# Patient Record
Sex: FEMALE | Race: WHITE | NOT HISPANIC OR LATINO | Employment: FULL TIME | URBAN - METROPOLITAN AREA
[De-identification: names, ages, dates, MRNs, and addresses within clinical notes are randomized per-mention and may not be internally consistent; named-entity substitution may affect disease eponyms.]

---

## 2023-05-13 ENCOUNTER — TRANSFERRED RECORDS (OUTPATIENT)
Dept: HEALTH INFORMATION MANAGEMENT | Facility: CLINIC | Age: 23
End: 2023-05-13

## 2023-07-31 ENCOUNTER — OFFICE VISIT (OUTPATIENT)
Dept: FAMILY MEDICINE | Facility: CLINIC | Age: 23
End: 2023-07-31
Payer: COMMERCIAL

## 2023-07-31 VITALS
DIASTOLIC BLOOD PRESSURE: 70 MMHG | SYSTOLIC BLOOD PRESSURE: 128 MMHG | WEIGHT: 167 LBS | OXYGEN SATURATION: 96 % | HEART RATE: 99 BPM | TEMPERATURE: 98 F | RESPIRATION RATE: 14 BRPM | BODY MASS INDEX: 24.73 KG/M2 | HEIGHT: 69 IN

## 2023-07-31 DIAGNOSIS — Z30.41 ENCOUNTER FOR SURVEILLANCE OF CONTRACEPTIVE PILLS: Primary | ICD-10-CM

## 2023-07-31 DIAGNOSIS — Z11.3 SCREEN FOR STD (SEXUALLY TRANSMITTED DISEASE): ICD-10-CM

## 2023-07-31 DIAGNOSIS — N92.1 BREAKTHROUGH BLEEDING ON BIRTH CONTROL PILLS: ICD-10-CM

## 2023-07-31 LAB
ERYTHROCYTE [DISTWIDTH] IN BLOOD BY AUTOMATED COUNT: 11.6 % (ref 10–15)
HCG UR QL: NEGATIVE
HCT VFR BLD AUTO: 43.9 % (ref 35–47)
HGB BLD-MCNC: 14.4 G/DL (ref 11.7–15.7)
MCH RBC QN AUTO: 30.9 PG (ref 26.5–33)
MCHC RBC AUTO-ENTMCNC: 32.8 G/DL (ref 31.5–36.5)
MCV RBC AUTO: 94 FL (ref 78–100)
PLATELET # BLD AUTO: 243 10E3/UL (ref 150–450)
RBC # BLD AUTO: 4.66 10E6/UL (ref 3.8–5.2)
T4 FREE SERPL-MCNC: 1.11 NG/DL (ref 0.9–1.7)
TSH SERPL DL<=0.005 MIU/L-ACNC: 4.77 UIU/ML (ref 0.3–4.2)
WBC # BLD AUTO: 5.1 10E3/UL (ref 4–11)

## 2023-07-31 PROCEDURE — 99204 OFFICE O/P NEW MOD 45 MIN: CPT | Performed by: NURSE PRACTITIONER

## 2023-07-31 PROCEDURE — 85027 COMPLETE CBC AUTOMATED: CPT | Performed by: NURSE PRACTITIONER

## 2023-07-31 PROCEDURE — 36415 COLL VENOUS BLD VENIPUNCTURE: CPT | Performed by: NURSE PRACTITIONER

## 2023-07-31 PROCEDURE — 84443 ASSAY THYROID STIM HORMONE: CPT | Performed by: NURSE PRACTITIONER

## 2023-07-31 PROCEDURE — 87491 CHLMYD TRACH DNA AMP PROBE: CPT | Performed by: NURSE PRACTITIONER

## 2023-07-31 PROCEDURE — 87591 N.GONORRHOEAE DNA AMP PROB: CPT | Performed by: NURSE PRACTITIONER

## 2023-07-31 PROCEDURE — 81025 URINE PREGNANCY TEST: CPT | Performed by: NURSE PRACTITIONER

## 2023-07-31 PROCEDURE — 84439 ASSAY OF FREE THYROXINE: CPT | Performed by: NURSE PRACTITIONER

## 2023-07-31 RX ORDER — LEVONORGESTREL/ETHIN.ESTRADIOL 0.1-0.02MG
1 TABLET ORAL DAILY
Qty: 84 TABLET | Refills: 2 | Status: SHIPPED | OUTPATIENT
Start: 2023-07-31 | End: 2024-04-01

## 2023-07-31 RX ORDER — LEVONORGESTREL/ETHIN.ESTRADIOL 0.1-0.02MG
1 TABLET ORAL DAILY
Status: CANCELLED | OUTPATIENT
Start: 2023-07-31

## 2023-07-31 NOTE — PROGRESS NOTES
Assessment & Plan     Encounter for surveillance of contraceptive pills  Breakthrough bleeding on birth control pills  Will do some lab work could just be different variations of manufacturers of birth control causing her breakthrough bleeding.  She has low suspicion for pregnancy or STD.  Will prescribe birth control through our pharmacy; may need to try brand name to see if it helps.  Will have pharmacy notify her once prescription has been sent.  She would like to avoid a 3-month continuous pack at this time.  Jaleesa verbalizes understanding of plan of care and is in agreement.    - TSH with free T4 reflex  - CBC with platelets  - HCG Qual, Urine ([id number])  - TSH with free T4 reflex  - CBC with platelets    Screen for STD (sexually transmitted disease)    - NEISSERIA GONORRHOEA PCR  - CHLAMYDIA TRACHOMATIS PCR        Return in about 8 months (around 3/31/2024).      LIGIA Watts [facility]   Jaleesa is a 23 year old, presenting for the following health issues:  Establish Care, New Patient, and Medication Request        7/31/2023     7:17 AM   Additional Questions   Roomed by Darrick HALE   Accompanied by Self       History of Present Illness       Reason for visit:  To renew my prescription and talk about maybe a differrent option    She eats 0-1 servings of fruits and vegetables daily.She consumes 1 sweetened beverage(s) daily.She exercises with enough effort to increase her heart rate 30 to 60 minutes per day.  She exercises with enough effort to increase her heart rate 4 days per week.   She is taking medications regularly.       Medication Followup of Contraception - Levonogestrel and Ethinyl Estradiol Tablets - 0.1/g/0.02mg --- gets different brands each time refills but same dosage - would like to get same each time  Taking Medication as prescribed: yes  Side Effects:  None -- Last month did get period 2x.   Medication Helping Symptoms:  yes    On ocp since  "2017.  Sexually active not a new partner.  Last Pap was 2 yeas ago at [facility] in [place] - got JENNY to fax.  Unsure what brand name is on birth control. Has been getting different pills packs colors.   She was also provided a 3 month OCP pack and didn't not want to try that she didn't know about this.   Acne has been worse.       Review of Systems   Constitutional, HEENT, cardiovascular, pulmonary, GI, , musculoskeletal, neuro, skin, endocrine and psych systems are negative, except as otherwise noted in the HPI.       Objective    /70 (BP Location: Left arm, Patient Position: Chair, Cuff Size: Adult Regular)   Pulse 99   Temp 98  F (36.7  C) (Tympanic)   Resp 14   Ht 5' 9.4\" (1.763 m)   Wt 167 lb (75.8 kg)   LMP 07/20/2023 (Approximate)   SpO2 96%   BMI 24.38 kg/m    Body mass index is 24.38 kg/m .  Physical Exam   GENERAL: healthy, alert and no distress, very pleasant  RESP: lungs clear to auscultation - no rales, rhonchi or wheezes  BREAST: normal without masses, tenderness or nipple discharge and no palpable axillary masses or adenopathy  CV: regular rate and rhythm, normal S1 S2, no S3 or S4, no murmur, click or rub, no peripheral edema and peripheral pulses strong  PSYCH: mentation appears normal, affect normal/bright            "

## 2023-08-01 LAB
C TRACH DNA SPEC QL NAA+PROBE: NEGATIVE
N GONORRHOEA DNA SPEC QL NAA+PROBE: NEGATIVE

## 2023-08-04 NOTE — RESULT ENCOUNTER NOTE
Dear Jaleesa,    Here is a summary of your recent test results:  Labs look good.   We should recheck your thyroid levels next year as there is a mild elevation.     Please call us at [phone] (or use [employer]) to address the above recommendations if needed.    Thank you for choosing [facility].  It was an honor and a privilege to participate in your care.       Healthy regards,    Gabriela Bright, JUAN DANIEL  [facility]

## 2023-08-13 ENCOUNTER — HEALTH MAINTENANCE LETTER (OUTPATIENT)
Age: 23
End: 2023-08-13

## 2023-10-23 ENCOUNTER — TELEPHONE (OUTPATIENT)
Dept: FAMILY MEDICINE | Facility: CLINIC | Age: 23
End: 2023-10-23
Payer: COMMERCIAL

## 2023-10-23 NOTE — TELEPHONE ENCOUNTER
Patient calls to see if provider would be able to drain her Bartholin's cyst that patient noticed yesterday. Patient has had soreness x 2 days with no drainage. Cyst has been drained roughly 3 times in the last 3 years or so.  She went to provider in [place] who was OBGYN who drained this cyst a few months ago.     Patient wants to know if provider can drain this cyst or if patient will need OBGYN referral (due to new insurance).    Please advise.    Thank you,  Dick Quijano, Triage RN [facility]  8:43 AM 10/23/2023

## 2023-10-23 NOTE — TELEPHONE ENCOUNTER
Spoke to pt. Has hx of Bartholins cyst and it is not super uncomfortable yet but she knows it will be soon. Has had this drained a couple times in the past.    Wanted to see female. Scheduled Thursday with Dr Ramey coming at 4pm Thursday.   Advised sitz bath and warm compress  Routed as fyi.      Judith HERNANDEZ RN BSN

## 2023-10-23 NOTE — TELEPHONE ENCOUNTER
Called and spoke with [facility] nurse. No referral needed. She will call patient and offer appointment this week.     KATHY FULLER RN on 10/23/2023 at 10:07 AM   [facility]

## 2023-10-23 NOTE — TELEPHONE ENCOUNTER
This type of cyst needs OB/GYN to drain and sometimes needs a specific catheter they use to put in there.  Please help her set up with OB/GYN as soon as possible.        Healthy regards,            Gabriela Bright, FNP-BC

## 2023-10-26 ENCOUNTER — OFFICE VISIT (OUTPATIENT)
Dept: OBGYN | Facility: CLINIC | Age: 23
End: 2023-10-26
Payer: COMMERCIAL

## 2023-10-26 VITALS
WEIGHT: 171.7 LBS | SYSTOLIC BLOOD PRESSURE: 118 MMHG | BODY MASS INDEX: 25.43 KG/M2 | HEIGHT: 69 IN | DIASTOLIC BLOOD PRESSURE: 78 MMHG

## 2023-10-26 DIAGNOSIS — N75.0 BARTHOLIN GLAND CYST: Primary | ICD-10-CM

## 2023-10-26 PROCEDURE — 87186 SC STD MICRODIL/AGAR DIL: CPT | Performed by: FAMILY MEDICINE

## 2023-10-26 PROCEDURE — 56420 I&D BARTHOLINS GLAND ABSCESS: CPT | Performed by: FAMILY MEDICINE

## 2023-10-26 PROCEDURE — 87077 CULTURE AEROBIC IDENTIFY: CPT | Performed by: FAMILY MEDICINE

## 2023-10-26 PROCEDURE — 87075 CULTR BACTERIA EXCEPT BLOOD: CPT | Performed by: FAMILY MEDICINE

## 2023-10-26 PROCEDURE — 99203 OFFICE O/P NEW LOW 30 MIN: CPT | Mod: 25 | Performed by: FAMILY MEDICINE

## 2023-10-26 PROCEDURE — 87070 CULTURE OTHR SPECIMN AEROBIC: CPT | Performed by: FAMILY MEDICINE

## 2023-10-26 RX ORDER — CEPHALEXIN 500 MG/1
500 CAPSULE ORAL 2 TIMES DAILY
Qty: 20 CAPSULE | Refills: 3 | Status: SHIPPED | OUTPATIENT
Start: 2023-10-26 | End: 2024-08-06

## 2023-10-26 RX ORDER — LIDOCAINE HYDROCHLORIDE 20 MG/ML
5 INJECTION, SOLUTION INFILTRATION; PERINEURAL ONCE
Status: COMPLETED | OUTPATIENT
Start: 2023-10-26 | End: 2023-10-26

## 2023-10-26 RX ADMIN — LIDOCAINE HYDROCHLORIDE 5 ML: 20 INJECTION, SOLUTION INFILTRATION; PERINEURAL at 16:51

## 2023-10-26 NOTE — PROGRESS NOTES
"SUBJECTIVE:  Jaleesa Palmer is an 23 year old woman who presents for   gynecology consult for bartholin gland cyst/abscess.      Patient's last menstrual period was 10/12/2023 (exact date).       History reviewed. No pertinent past medical history.       History reviewed. No pertinent family history.    History reviewed. No pertinent surgical history.    Current Outpatient Medications   Medication    levonorgestrel-ethinyl estradiol (AVIANE) 0.1-20 MG-MCG tablet     Current Facility-Administered Medications   Medication    lidocaine 2% injection (MDV)     No Known Allergies    Social History     Tobacco Use    Smoking status: Never    Smokeless tobacco: Never   Substance Use Topics    Alcohol use: Not on file       Review Of Systems  Ears/Nose/Throat: negative  Respiratory: No shortness of breath, dyspnea on exertion, cough, or hemoptysis  Cardiovascular: negative  Gastrointestinal: negative  Genitourinary: See HPI   Constitutional, HEENT, cardiovascular, pulmonary, GI, , musculoskeletal, neuro, skin, endocrine and psych systems are negative, except as otherwise noted.    OBJECTIVE:  /78   Ht 1.753 m (5' 9\")   Wt 77.9 kg (171 lb 11.2 oz)   LMP 10/12/2023 (Exact Date)   BMI 25.36 kg/m    General appearance: healthy, alert, and no distress  Pelvic: Pelvic:  Pelvic examination with no pap/no Gonorrhea and Chlamydia   including  External genitalia normal right labia, left labia swollen 2 cm     Procedure: Incision and Drainage  Patient left labial area prepped with betadine.  Injected with 1% plain Lidocaine.  Cruciate incision made over left labia area.  Drainage noted.  Attempted word catheter placement, unable to place due to superficial nature of incision with likely retraction of bartholin gland.      ASSESSMENT:  Jaleesa Palmer is an 23 year old woman who presents for   gynecology consult for bartholin gland cyst/abscess.        PLAN:  Dx:  1)  bartholin gland cyst incision and drainage see above "   Keflex for 10 days   Culture sent         Labs were reviewed in [facility]   Imaging was reviewed in Epic   Tests and documents were reviewed.   Discussion of management or test interpretation       Dr. Tova Ramey,     Obstetrics and Gynecology  [facility] - [place] and [place]

## 2023-10-26 NOTE — PATIENT INSTRUCTIONS
Return in a week for recheck     Dr. Tova Ramey,     Obstetrics and Gynecology  [facility] [place] and [place]

## 2023-10-26 NOTE — NURSING NOTE
"Chief Complaint   Patient presents with    Vaginal Problem     Bartholin cyst--noticed it Sat with pain then noticed bump on Sun       Initial /78   Ht 1.753 m (5' 9\")   Wt 77.9 kg (171 lb 11.2 oz)   LMP 10/12/2023 (Exact Date)   BMI 25.36 kg/m   Estimated body mass index is 25.36 kg/m  as calculated from the following:    Height as of this encounter: 1.753 m (5' 9\").    Weight as of this encounter: 77.9 kg (171 lb 11.2 oz).  BP completed using cuff size: regular    Questioned patient about current smoking habits.  Pt. has never smoked.      No obstetric history on file.    The following HM Due: NONE    "

## 2023-10-29 LAB
BACTERIA FLD CULT: ABNORMAL
BACTERIA FLD CULT: NORMAL

## 2023-10-30 ENCOUNTER — OFFICE VISIT (OUTPATIENT)
Dept: OBGYN | Facility: CLINIC | Age: 23
End: 2023-10-30
Payer: COMMERCIAL

## 2023-10-30 VITALS
WEIGHT: 171 LBS | BODY MASS INDEX: 25.33 KG/M2 | SYSTOLIC BLOOD PRESSURE: 110 MMHG | DIASTOLIC BLOOD PRESSURE: 78 MMHG | HEIGHT: 69 IN

## 2023-10-30 DIAGNOSIS — N75.0 BARTHOLIN GLAND CYST: Primary | ICD-10-CM

## 2023-10-30 PROCEDURE — 99024 POSTOP FOLLOW-UP VISIT: CPT | Performed by: FAMILY MEDICINE

## 2023-10-30 NOTE — PROGRESS NOTES
"SUBJECTIVE: Pre-operative history and physical    Jaleesa Palmer is an 23 year old woman who presents for   Gyn follow-up exam. She was previously seen for bartholin cyst incision and drainage,   with re-accumulation of fluid, Hx of bartholin gland abscess x 3-4.  Had a fever of 101.5 over the weekend.    History reviewed. No pertinent past medical history.       History reviewed. No pertinent family history.    History reviewed. No pertinent surgical history.    Current Outpatient Medications   Medication    cephALEXin (KEFLEX) 500 MG capsule    levonorgestrel-ethinyl estradiol (AVIANE) 0.1-20 MG-MCG tablet     No current facility-administered medications for this visit.     No Known Allergies    Social History     Tobacco Use    Smoking status: Never    Smokeless tobacco: Never   Substance Use Topics    Alcohol use: Not on file       Review Of Systems  Ears/Nose/Throat: negative  Respiratory: No shortness of breath, dyspnea on exertion, cough, or hemoptysis  Cardiovascular: negative  Gastrointestinal: negative  Genitourinary: See HPI   Constitutional, HEENT, cardiovascular, pulmonary, GI, , musculoskeletal, neuro, skin, endocrine and psych systems are negative, except as otherwise noted.      OBJECTIVE:  /78   Ht 1.753 m (5' 9\")   Wt 77.6 kg (171 lb)   LMP 10/12/2023 (Exact Date)   BMI 25.25 kg/m    General appearance: healthy, alert, and no distress  Skin: Skin color, texture, turgor normal. No rashes or lesions.  Ears: negative  Nose/Sinuses: Nares normal. Septum midline. Mucosa normal. No drainage or sinus tenderness.  Oropharynx: Lips, mucosa, and tongue normal. Teeth and gums normal.  Neck: Neck supple. No adenopathy. Thyroid symmetric, normal size,, Carotids without bruits.  Lungs: negative, Percussion normal. Good diaphragmatic excursion. Lungs clear  Heart: negative, PMI normal. No lifts, heaves, or thrills. RRR. No murmurs, clicks gallops or rub  Pelvic: Pelvic examination with no pap/no " Gonorrhea and Chlamydia  External genitalia with left bartholin gland cyst    ASSESSMENT:  Jaleesa Palmer is an 23 year old woman who presents for   Gyn follow-up exam. She was previously seen for bartholin cyst incision and drainage,   with re-accumulation of fluid, Hx of bartholin gland abscess x 3-4.  Had a fever of 101.5 over the weekend.  Cultures showing E.coli and Enterococcus.     PLAN:  Dx: bartholin gland cyst abscess  1)  Desires marsupialization, orders sent   Pain is controlled with advil   2) She is cleared for surgery     Labs were reviewed in [facility]   Imaging was reviewed in Epic   Tests and documents were reviewed.   Discussion of management or test interpretation     Dr. Tova Ramey, DO    OB/GYN   [facility]

## 2023-10-30 NOTE — PATIENT INSTRUCTIONS
Maeve will call with surgery times     Dr. Tova Ramey, DO    Obstetrics and Gynecology  [facility] - [place] and [place]

## 2023-10-30 NOTE — NURSING NOTE
"Chief Complaint   Patient presents with    Vaginal Problem     Discuss surgery--feels like it's not drained all the way       Initial /78   Ht 1.753 m (5' 9\")   Wt 77.6 kg (171 lb)   LMP 10/12/2023 (Exact Date)   BMI 25.25 kg/m   Estimated body mass index is 25.25 kg/m  as calculated from the following:    Height as of this encounter: 1.753 m (5' 9\").    Weight as of this encounter: 77.6 kg (171 lb).  BP completed using cuff size: regular    Questioned patient about current smoking habits.  Pt. has never smoked.      No obstetric history on file.    The following HM Due: NONE    "

## 2023-10-31 ENCOUNTER — TELEPHONE (OUTPATIENT)
Dept: OBGYN | Facility: CLINIC | Age: 23
End: 2023-10-31
Payer: COMMERCIAL

## 2023-10-31 NOTE — TELEPHONE ENCOUNTER
Patient Name: Jaleesa Palmer   MRN: [id number]   Case#: [id number]   Surgeon(s) and Role:      * Tova Ramey,  - Primary   Date requested: 11/2/2023   Location: [facility] OR   Procedure(s):   MARSUPIALIZATION, CYST, BARTHOLIN'S GLAND (Left)

## 2023-10-31 NOTE — TELEPHONE ENCOUNTER
Type of surgery: marsupialization, bartholin's gland cyst  Location of surgery: [facility]  Date and time of surgery: 11/1/23 @ 1:50 pm  Surgeon: Dr. Ramey  Pre-Op Appt Date: day of surgery by surgeon  Post-Op Appt Date: 11/9/23   Packet sent out: Yes  Pre-cert/Authorization completed:  No  Date: 10/31/23

## 2023-11-01 ENCOUNTER — ANESTHESIA EVENT (OUTPATIENT)
Dept: SURGERY | Facility: CLINIC | Age: 23
End: 2023-11-01
Payer: COMMERCIAL

## 2023-11-01 ENCOUNTER — HOSPITAL ENCOUNTER (OUTPATIENT)
Facility: CLINIC | Age: 23
Discharge: HOME | End: 2023-11-01
Attending: FAMILY MEDICINE | Admitting: FAMILY MEDICINE
Payer: COMMERCIAL

## 2023-11-01 ENCOUNTER — ANESTHESIA (OUTPATIENT)
Dept: SURGERY | Facility: CLINIC | Age: 23
End: 2023-11-01
Payer: COMMERCIAL

## 2023-11-01 VITALS
RESPIRATION RATE: 16 BRPM | BODY MASS INDEX: 24.66 KG/M2 | TEMPERATURE: 98.5 F | WEIGHT: 167 LBS | SYSTOLIC BLOOD PRESSURE: 121 MMHG | DIASTOLIC BLOOD PRESSURE: 74 MMHG | OXYGEN SATURATION: 96 % | HEART RATE: 98 BPM

## 2023-11-01 DIAGNOSIS — N75.1 ABSCESS OF LEFT BARTHOLIN'S GLAND: Primary | ICD-10-CM

## 2023-11-01 PROCEDURE — 56440 MRSPLZATN BRTHLNS GLND CST: CPT | Performed by: FAMILY MEDICINE

## 2023-11-01 PROCEDURE — 250N000009 HC RX 250: Performed by: ANESTHESIOLOGY

## 2023-11-01 PROCEDURE — 250N000011 HC RX IP 250 OP 636: Performed by: FAMILY MEDICINE

## 2023-11-01 PROCEDURE — 250N000025 HC SEVOFLURANE, PER MIN: Performed by: FAMILY MEDICINE

## 2023-11-01 PROCEDURE — 710N000009 HC RECOVERY PHASE 1, LEVEL 1, PER MIN: Performed by: FAMILY MEDICINE

## 2023-11-01 PROCEDURE — 250N000011 HC RX IP 250 OP 636: Performed by: ANESTHESIOLOGY

## 2023-11-01 PROCEDURE — 710N000012 HC RECOVERY PHASE 2, PER MINUTE: Performed by: FAMILY MEDICINE

## 2023-11-01 PROCEDURE — 999N000141 HC STATISTIC PRE-PROCEDURE NURSING ASSESSMENT: Performed by: FAMILY MEDICINE

## 2023-11-01 PROCEDURE — 360N000075 HC SURGERY LEVEL 2, PER MIN: Performed by: FAMILY MEDICINE

## 2023-11-01 PROCEDURE — 250N000013 HC RX MED GY IP 250 OP 250 PS 637: Performed by: FAMILY MEDICINE

## 2023-11-01 PROCEDURE — 272N000001 HC OR GENERAL SUPPLY STERILE: Performed by: FAMILY MEDICINE

## 2023-11-01 PROCEDURE — 258N000003 HC RX IP 258 OP 636

## 2023-11-01 PROCEDURE — 370N000017 HC ANESTHESIA TECHNICAL FEE, PER MIN: Performed by: FAMILY MEDICINE

## 2023-11-01 PROCEDURE — 258N000003 HC RX IP 258 OP 636: Performed by: ANESTHESIOLOGY

## 2023-11-01 RX ORDER — FENTANYL CITRATE 50 UG/ML
INJECTION, SOLUTION INTRAMUSCULAR; INTRAVENOUS PRN
Status: DISCONTINUED | OUTPATIENT
Start: 2023-11-01 | End: 2023-11-01

## 2023-11-01 RX ORDER — IBUPROFEN 800 MG/1
800 TABLET, FILM COATED ORAL EVERY 6 HOURS PRN
Qty: 30 TABLET | Refills: 0 | Status: SHIPPED | OUTPATIENT
Start: 2023-11-01 | End: 2024-08-06

## 2023-11-01 RX ORDER — ONDANSETRON 4 MG/1
4 TABLET, ORALLY DISINTEGRATING ORAL EVERY 8 HOURS PRN
Qty: 4 TABLET | Refills: 0 | Status: SHIPPED | OUTPATIENT
Start: 2023-11-01 | End: 2024-08-06

## 2023-11-01 RX ORDER — LABETALOL HYDROCHLORIDE 5 MG/ML
10 INJECTION, SOLUTION INTRAVENOUS EVERY 10 MIN PRN
Status: DISCONTINUED | OUTPATIENT
Start: 2023-11-01 | End: 2023-11-01 | Stop reason: HOSPADM

## 2023-11-01 RX ORDER — ALBUTEROL SULFATE 0.83 MG/ML
2.5 SOLUTION RESPIRATORY (INHALATION) EVERY 4 HOURS PRN
Status: DISCONTINUED | OUTPATIENT
Start: 2023-11-01 | End: 2023-11-01 | Stop reason: HOSPADM

## 2023-11-01 RX ORDER — ONDANSETRON 4 MG/1
4 TABLET, ORALLY DISINTEGRATING ORAL EVERY 30 MIN PRN
Status: DISCONTINUED | OUTPATIENT
Start: 2023-11-01 | End: 2023-11-01 | Stop reason: HOSPADM

## 2023-11-01 RX ORDER — SODIUM CHLORIDE, SODIUM LACTATE, POTASSIUM CHLORIDE, CALCIUM CHLORIDE 600; 310; 30; 20 MG/100ML; MG/100ML; MG/100ML; MG/100ML
INJECTION, SOLUTION INTRAVENOUS CONTINUOUS PRN
Status: DISCONTINUED | OUTPATIENT
Start: 2023-11-01 | End: 2023-11-01

## 2023-11-01 RX ORDER — ACETAMINOPHEN 325 MG/1
975 TABLET ORAL ONCE
Status: COMPLETED | OUTPATIENT
Start: 2023-11-01 | End: 2023-11-01

## 2023-11-01 RX ORDER — OXYCODONE HYDROCHLORIDE 10 MG/1
10 TABLET ORAL EVERY 4 HOURS PRN
Status: DISCONTINUED | OUTPATIENT
Start: 2023-11-01 | End: 2023-11-01 | Stop reason: HOSPADM

## 2023-11-01 RX ORDER — OXYCODONE HYDROCHLORIDE 5 MG/1
5-10 TABLET ORAL EVERY 4 HOURS PRN
Qty: 6 TABLET | Refills: 0 | Status: SHIPPED | OUTPATIENT
Start: 2023-11-01 | End: 2024-08-06

## 2023-11-01 RX ORDER — FENTANYL CITRATE 50 UG/ML
50 INJECTION, SOLUTION INTRAMUSCULAR; INTRAVENOUS EVERY 5 MIN PRN
Status: DISCONTINUED | OUTPATIENT
Start: 2023-11-01 | End: 2023-11-01 | Stop reason: HOSPADM

## 2023-11-01 RX ORDER — FENTANYL CITRATE 50 UG/ML
25 INJECTION, SOLUTION INTRAMUSCULAR; INTRAVENOUS
Status: DISCONTINUED | OUTPATIENT
Start: 2023-11-01 | End: 2023-11-01 | Stop reason: HOSPADM

## 2023-11-01 RX ORDER — DIAZEPAM 10 MG/2ML
2.5 INJECTION, SOLUTION INTRAMUSCULAR; INTRAVENOUS
Status: DISCONTINUED | OUTPATIENT
Start: 2023-11-01 | End: 2023-11-01 | Stop reason: HOSPADM

## 2023-11-01 RX ORDER — SODIUM CHLORIDE, SODIUM LACTATE, POTASSIUM CHLORIDE, CALCIUM CHLORIDE 600; 310; 30; 20 MG/100ML; MG/100ML; MG/100ML; MG/100ML
INJECTION, SOLUTION INTRAVENOUS CONTINUOUS
Status: DISCONTINUED | OUTPATIENT
Start: 2023-11-01 | End: 2023-11-01 | Stop reason: HOSPADM

## 2023-11-01 RX ORDER — KETOROLAC TROMETHAMINE 30 MG/ML
INJECTION, SOLUTION INTRAMUSCULAR; INTRAVENOUS PRN
Status: DISCONTINUED | OUTPATIENT
Start: 2023-11-01 | End: 2023-11-01

## 2023-11-01 RX ORDER — ONDANSETRON 2 MG/ML
INJECTION INTRAMUSCULAR; INTRAVENOUS PRN
Status: DISCONTINUED | OUTPATIENT
Start: 2023-11-01 | End: 2023-11-01

## 2023-11-01 RX ORDER — CEFAZOLIN SODIUM/WATER 2 G/20 ML
2 SYRINGE (ML) INTRAVENOUS
Status: DISCONTINUED | OUTPATIENT
Start: 2023-11-01 | End: 2023-11-01 | Stop reason: HOSPADM

## 2023-11-01 RX ORDER — ONDANSETRON 2 MG/ML
4 INJECTION INTRAMUSCULAR; INTRAVENOUS EVERY 30 MIN PRN
Status: DISCONTINUED | OUTPATIENT
Start: 2023-11-01 | End: 2023-11-01 | Stop reason: HOSPADM

## 2023-11-01 RX ORDER — PROPOFOL 10 MG/ML
INJECTION, EMULSION INTRAVENOUS PRN
Status: DISCONTINUED | OUTPATIENT
Start: 2023-11-01 | End: 2023-11-01

## 2023-11-01 RX ORDER — PROPOFOL 10 MG/ML
INJECTION, EMULSION INTRAVENOUS CONTINUOUS PRN
Status: DISCONTINUED | OUTPATIENT
Start: 2023-11-01 | End: 2023-11-01

## 2023-11-01 RX ORDER — AMOXICILLIN 250 MG
1-2 CAPSULE ORAL 2 TIMES DAILY
Qty: 30 TABLET | Refills: 0 | Status: SHIPPED | OUTPATIENT
Start: 2023-11-01 | End: 2024-08-06

## 2023-11-01 RX ORDER — OXYCODONE HYDROCHLORIDE 5 MG/1
5 TABLET ORAL
Status: DISCONTINUED | OUTPATIENT
Start: 2023-11-01 | End: 2023-11-01 | Stop reason: HOSPADM

## 2023-11-01 RX ORDER — BUPIVACAINE HYDROCHLORIDE 2.5 MG/ML
INJECTION, SOLUTION EPIDURAL; INFILTRATION; INTRACAUDAL PRN
Status: DISCONTINUED | OUTPATIENT
Start: 2023-11-01 | End: 2023-11-01 | Stop reason: HOSPADM

## 2023-11-01 RX ORDER — ACETAMINOPHEN 325 MG/1
975 TABLET ORAL EVERY 6 HOURS PRN
Qty: 50 TABLET | Refills: 0 | Status: SHIPPED | OUTPATIENT
Start: 2023-11-01 | End: 2024-08-06

## 2023-11-01 RX ORDER — FENTANYL CITRATE 50 UG/ML
25 INJECTION, SOLUTION INTRAMUSCULAR; INTRAVENOUS EVERY 5 MIN PRN
Status: DISCONTINUED | OUTPATIENT
Start: 2023-11-01 | End: 2023-11-01 | Stop reason: HOSPADM

## 2023-11-01 RX ORDER — OXYCODONE HYDROCHLORIDE 5 MG/1
5 TABLET ORAL EVERY 4 HOURS PRN
Status: DISCONTINUED | OUTPATIENT
Start: 2023-11-01 | End: 2023-11-01 | Stop reason: HOSPADM

## 2023-11-01 RX ORDER — MEPERIDINE HYDROCHLORIDE 25 MG/ML
12.5 INJECTION INTRAMUSCULAR; INTRAVENOUS; SUBCUTANEOUS EVERY 5 MIN PRN
Status: DISCONTINUED | OUTPATIENT
Start: 2023-11-01 | End: 2023-11-01 | Stop reason: HOSPADM

## 2023-11-01 RX ORDER — DEXAMETHASONE SODIUM PHOSPHATE 4 MG/ML
4 INJECTION, SOLUTION INTRA-ARTICULAR; INTRALESIONAL; INTRAMUSCULAR; INTRAVENOUS; SOFT TISSUE
Status: DISCONTINUED | OUTPATIENT
Start: 2023-11-01 | End: 2023-11-01 | Stop reason: HOSPADM

## 2023-11-01 RX ORDER — LIDOCAINE HYDROCHLORIDE 20 MG/ML
INJECTION, SOLUTION INFILTRATION; PERINEURAL PRN
Status: DISCONTINUED | OUTPATIENT
Start: 2023-11-01 | End: 2023-11-01

## 2023-11-01 RX ORDER — ACETAMINOPHEN 325 MG/1
975 TABLET ORAL ONCE
Status: DISCONTINUED | OUTPATIENT
Start: 2023-11-01 | End: 2023-11-01 | Stop reason: HOSPADM

## 2023-11-01 RX ORDER — KETOROLAC TROMETHAMINE 30 MG/ML
30 INJECTION, SOLUTION INTRAMUSCULAR; INTRAVENOUS ONCE
Status: COMPLETED | OUTPATIENT
Start: 2023-11-01 | End: 2023-11-01

## 2023-11-01 RX ORDER — IBUPROFEN 800 MG/1
800 TABLET, FILM COATED ORAL ONCE
Status: DISCONTINUED | OUTPATIENT
Start: 2023-11-01 | End: 2023-11-01 | Stop reason: HOSPADM

## 2023-11-01 RX ORDER — DEXAMETHASONE SODIUM PHOSPHATE 4 MG/ML
INJECTION, SOLUTION INTRA-ARTICULAR; INTRALESIONAL; INTRAMUSCULAR; INTRAVENOUS; SOFT TISSUE PRN
Status: DISCONTINUED | OUTPATIENT
Start: 2023-11-01 | End: 2023-11-01

## 2023-11-01 RX ORDER — CEFAZOLIN SODIUM/WATER 2 G/20 ML
2 SYRINGE (ML) INTRAVENOUS SEE ADMIN INSTRUCTIONS
Status: DISCONTINUED | OUTPATIENT
Start: 2023-11-01 | End: 2023-11-01 | Stop reason: HOSPADM

## 2023-11-01 RX ADMIN — ACETAMINOPHEN 975 MG: 325 TABLET, FILM COATED ORAL at 12:35

## 2023-11-01 RX ADMIN — MIDAZOLAM 2 MG: 1 INJECTION INTRAMUSCULAR; INTRAVENOUS at 13:04

## 2023-11-01 RX ADMIN — ONDANSETRON 4 MG: 2 INJECTION INTRAMUSCULAR; INTRAVENOUS at 13:42

## 2023-11-01 RX ADMIN — PROPOFOL 200 MG: 10 INJECTION, EMULSION INTRAVENOUS at 13:09

## 2023-11-01 RX ADMIN — KETOROLAC TROMETHAMINE 30 MG: 30 INJECTION, SOLUTION INTRAMUSCULAR at 13:42

## 2023-11-01 RX ADMIN — PROPOFOL 50 MCG/KG/MIN: 10 INJECTION, EMULSION INTRAVENOUS at 13:17

## 2023-11-01 RX ADMIN — DEXAMETHASONE SODIUM PHOSPHATE 8 MG: 4 INJECTION, SOLUTION INTRA-ARTICULAR; INTRALESIONAL; INTRAMUSCULAR; INTRAVENOUS; SOFT TISSUE at 13:10

## 2023-11-01 RX ADMIN — Medication 2 G: at 12:57

## 2023-11-01 RX ADMIN — LIDOCAINE HYDROCHLORIDE 50 MG: 20 INJECTION, SOLUTION INFILTRATION; PERINEURAL at 13:09

## 2023-11-01 RX ADMIN — FENTANYL CITRATE 100 MCG: 50 INJECTION INTRAMUSCULAR; INTRAVENOUS at 13:12

## 2023-11-01 RX ADMIN — SODIUM CHLORIDE, POTASSIUM CHLORIDE, SODIUM LACTATE AND CALCIUM CHLORIDE: 600; 310; 30; 20 INJECTION, SOLUTION INTRAVENOUS at 12:36

## 2023-11-01 RX ADMIN — KETOROLAC TROMETHAMINE 30 MG: 30 INJECTION INTRAMUSCULAR; INTRAVENOUS at 12:39

## 2023-11-01 RX ADMIN — SODIUM CHLORIDE, POTASSIUM CHLORIDE, SODIUM LACTATE AND CALCIUM CHLORIDE: 600; 310; 30; 20 INJECTION, SOLUTION INTRAVENOUS at 12:58

## 2023-11-01 ASSESSMENT — ACTIVITIES OF DAILY LIVING (ADL)
ADLS_ACUITY_SCORE: 35
ADLS_ACUITY_SCORE: 35

## 2023-11-01 NOTE — DISCHARGE INSTRUCTIONS
Follow up in 1-2 weeks   Call [phone] for post op concerns, this will get you to the answering service for the on-call doctor.     Things to be concerned with is: uncontrolled vomiting, bleeding that is more than spotting, pain that doesn't respond to oxycodone and ibuprofen and tylenol   Also fever or general unwell feeling or increased pain.     Also it is ok to please also call for any reason or concern!     Dr. Tova Ramey, DO    OB/GYN   [facility]        GENERAL ANESTHESIA OR SEDATION ADULT DISCHARGE INSTRUCTIONS   SPECIAL PRECAUTIONS FOR 24 HOURS AFTER SURGERY    IT IS NOT UNUSUAL TO FEEL LIGHT-HEADED OR FAINT, UP TO 24 HOURS AFTER SURGERY OR WHILE TAKING PAIN MEDICATION.  IF YOU HAVE THESE SYMPTOMS; SIT FOR A FEW MINUTES BEFORE STANDING AND HAVE SOMEONE ASSIST YOU WHEN YOU GET UP TO WALK OR USE THE BATHROOM.    YOU SHOULD REST AND RELAX FOR THE NEXT 24 HOURS AND YOU MUST MAKE ARRANGEMENTS TO HAVE SOMEONE STAY WITH YOU FOR AT LEAST 24 HOURS AFTER YOUR DISCHARGE.  AVOID HAZARDOUS AND STRENUOUS ACTIVITIES.  DO NOT MAKE IMPORTANT DECISIONS FOR 24 HOURS.    DO NOT DRIVE ANY VEHICLE OR OPERATE MECHANICAL EQUIPMENT FOR 24 HOURS FOLLOWING THE END OF YOUR SURGERY.  EVEN THOUGH YOU MAY FEEL NORMAL, YOUR REACTIONS MAY BE AFFECTED BY THE MEDICATION YOU HAVE RECEIVED.    DO NOT DRINK ALCOHOLIC BEVERAGES FOR 24 HOURS FOLLOWING YOUR SURGERY.    DRINK CLEAR LIQUIDS (APPLE JUICE, GINGER ALE, 7-UP, BROTH, ETC.).  PROGRESS TO YOUR REGULAR DIET AS YOU FEEL ABLE.    YOU MAY HAVE A DRY MOUTH, A SORE THROAT, MUSCLES ACHES OR TROUBLE SLEEPING.  THESE SHOULD GO AWAY AFTER 24 HOURS.    CALL YOUR DOCTOR FOR ANY OF THE FOLLOWING:  SIGNS OF INFECTION (FEVER, GROWING TENDERNESS AT THE SURGERY SITE, A LARGE AMOUNT OF DRAINAGE OR BLEEDING, SEVERE PAIN, FOUL-SMELLING DRAINAGE, REDNESS OR SWELLING.    IT HAS BEEN OVER 8 TO 10 HOURS SINCE SURGERY AND YOU ARE STILL NOT ABLE TO URINATE (PASS  WATER).     You received Toradol, an IV form of Ibuprofen (Motrin) at 12:40pm.  Do not take any Ibuprofen products until 8:40pm.    Maximum acetaminophen (Tylenol) dose from all sources should not exceed 4 grams (4000 mg) per day. You took 975mg at 12:35pm, next dose after 8:35pm

## 2023-11-01 NOTE — ANESTHESIA PROCEDURE NOTES
Airway       Patient location during procedure: OR       Procedure Start/Stop Times: 11/1/2023 1:15 PM  Staff -        Anesthesiologist:  Jose Juan Vidal MD       CRNA: Maty Tim APRN CRNA       Performed By: CRNA  Consent for Airway        Urgency: elective  Indications and Patient Condition       Indications for airway management: turner-procedural       Induction type:intravenous       Mask difficulty assessment: 1 - vent by mask    Final Airway Details       Final airway type: supraglottic airway    Supraglottic Airway Details        Type: LMA       Brand: Air-Q       LMA size: 3    Post intubation assessment        Placement verified by: capnometry and chest rise        Number of attempts at approach: 1       Number of other approaches attempted: 0       Secured with: plastic tape       Ease of procedure: easy       Dentition: Intact and Unchanged    Medication(s) Administered   Medication Administration Time: 11/1/2023 1:15 PM

## 2023-11-01 NOTE — ANESTHESIA CARE TRANSFER NOTE
Patient: Jaleesa Palmer    Procedure: Procedure(s):  MARSUPIALIZATION, CYST, BARTHOLIN'S GLAND       Diagnosis: Bartholin gland cyst [N75.0]  Diagnosis Additional Information: No value filed.    Anesthesia Type:   General     Note:    Oropharynx: oropharynx clear of all foreign objects and spontaneously breathing  Level of Consciousness: awake  Oxygen Supplementation: face mask  Level of Supplemental Oxygen (L/min / FiO2): 6  Independent Airway: airway patency satisfactory and stable  Dentition: dentition unchanged  Vital Signs Stable: post-procedure vital signs reviewed and stable  Report to RN Given: handoff report given  Patient transferred to: PACU    Handoff Report: Identifed the Patient, Identified the Reponsible Provider, Reviewed the pertinent medical history, Discussed the surgical course, Reviewed Intra-OP anesthesia mangement and issues during anesthesia, Set expectations for post-procedure period and Allowed opportunity for questions and acknowledgement of understanding      Vitals:  Vitals Value Taken Time   /67    Temp 36.1    Pulse 96 11/01/23 1357   Resp 20 11/01/23 1357   SpO2 100 % 11/01/23 1357   Vitals shown include unfiled device data.    Electronically Signed By: LIGIA Tucker CRNA  November 1, 2023  1:58 PM

## 2023-11-01 NOTE — OP NOTE
Pre-procedure diagnosis: 22 y/o with recurrent bartholin gland cyst/abscess  Post-procedure diagnosis: 22 y/o with recurrent bartholin gland cyst/abscess    Procedure: marsupialization of left bartholin gland  Surgeon: Dr. Tova Ramey    Assistant:  none  Complications: none   EBL: 5 ml   Indications:22 y/o with recurrent bartholin gland cyst/abscess.  She has had this 4-5 times previously.  Most recently she had an office incision and drainage with re-accumulation of abscess over the weekend. She presents now for definitve therapy.   Risks, benefits and alternatives to the procedure are discussed, she desires.  Definitive surgical management.  Procedure: After informed consent was obtained, the patient was taken to the operating room and placed under general anesthesia. She was prepped and draped in the normal fashion. A time out was performed. The bartholin gland with cyst/abscess  was identified. A cruciate incision was made in the skin. The cyst was reached and entered sharply and drained.  The exudate was sent for culture.  The cyst wall was identified and the cyst wall was sutured to the skin in 8 interrupted sutures using 4-0 vicryl.  The opening was noted to be hemostatic.  The diameter of the opening was 5 mm. The patient tolerated the procedure well and was awakened from anesthesia and taken to the recovery room in stable condition. Sponge, lap and needle counts are correct x 2.   Dr. Tova Ramey, DO   Obstetrics and Gynecology    [facility]

## 2023-11-01 NOTE — BRIEF OP NOTE
[facility]    Brief Operative Note    Pre-operative diagnosis: Bartholin gland cyst [N75.0]  Post-operative diagnosis 24 y/o female with recurrent bartholin cyst/abscess s/p bartholin gland marsupialization    Procedure: MARSUPIALIZATION, CYST, BARTHOLIN'S GLAND, Left - Perineum    Surgeon: Surgeon(s) and Role:     * Toav Ramey, DO - Primary  Anesthesia: General   Estimated Blood Loss: Less than 5 ml    Drains: None  Specimens: * No specimens in log *  Findings:   Left bartholin gland cyst/abscess 3 cm diameter .  Complications: None.  Implants: * No implants in log *

## 2023-11-01 NOTE — ANESTHESIA POSTPROCEDURE EVALUATION
Patient: Jaleesa Palmer    Procedure: Procedure(s):  MARSUPIALIZATION, CYST, BARTHOLIN'S GLAND       Anesthesia Type:  General    Note:     Postop Pain Control: Uneventful            Sign Out: Well controlled pain   PONV: No   Neuro/Psych: Uneventful            Sign Out: Acceptable/Baseline neuro status   Airway/Respiratory: Uneventful            Sign Out: Acceptable/Baseline resp. status   CV/Hemodynamics: Uneventful            Sign Out: Acceptable CV status   Other NRE: NONE   DID A NON-ROUTINE EVENT OCCUR? No           Last vitals:  Vitals Value Taken Time   /71 11/01/23 1420   Temp 98.2  F (36.8  C) 11/01/23 1400   Pulse 80 11/01/23 1424   Resp 10 11/01/23 1424   SpO2 100 % 11/01/23 1424   Vitals shown include unfiled device data.    Electronically Signed By: Jose Juan Vidal MD  November 1, 2023  2:25 PM

## 2023-11-01 NOTE — ANESTHESIA PREPROCEDURE EVALUATION
"Anesthesia Pre-Procedure Evaluation    Patient: Jaleesa Palmer   MRN: [id number] : 2000        Procedure : Procedure(s):  MARSUPIALIZATION, CYST, BARTHOLIN'S GLAND          History reviewed. No pertinent past medical history.   Past Surgical History:   Procedure Laterality Date    wisdom teeth        No Known Allergies   Social History     Tobacco Use    Smoking status: Never     Passive exposure: Never    Smokeless tobacco: Never   Substance Use Topics    Alcohol use: Yes     Comment: occas      Wt Readings from Last 1 Encounters:   23 75.8 kg (167 lb)        Anesthesia Evaluation   Pt has had prior anesthetic. Type: General.    No history of anesthetic complications       ROS/MED HX  ENT/Pulmonary:  - neg pulmonary ROS     Neurologic:  - neg neurologic ROS     Cardiovascular:  - neg cardiovascular ROS     METS/Exercise Tolerance:     Hematologic:  - neg hematologic  ROS     Musculoskeletal:  - neg musculoskeletal ROS     GI/Hepatic:  - neg GI/hepatic ROS     Renal/Genitourinary:  - neg Renal ROS     Endo:  - neg endo ROS     Psychiatric/Substance Use:  - neg psychiatric ROS     Infectious Disease:  - neg infectious disease ROS     Malignancy:  - neg malignancy ROS     Other:  - neg other ROS          Physical Exam    Airway        Mallampati: III   TM distance: > 3 FB   Neck ROM: full   Mouth opening: > 3 cm    Respiratory Devices and Support         Dental  no notable dental history         Cardiovascular   cardiovascular exam normal          Pulmonary   pulmonary exam normal                OUTSIDE LABS:  CBC:   Lab Results   Component Value Date    WBC 5.1 2023    HGB 14.4 2023    HCT 43.9 2023     2023     BMP: No results found for: \"NA\", \"POTASSIUM\", \"CHLORIDE\", \"CO2\", \"BUN\", \"CR\", \"GLC\"  COAGS: No results found for: \"PTT\", \"INR\", \"FIBR\"  POC:   Lab Results   Component Value Date    HCG Negative 2023     HEPATIC: No results found for: \"ALBUMIN\", \"PROTTOTAL\", " "\"ALT\", \"AST\", \"GGT\", \"ALKPHOS\", \"BILITOTAL\", \"BILIDIRECT\", \"CIARA\"  OTHER:   Lab Results   Component Value Date    TSH 4.77 (H) 07/31/2023    T4 1.11 07/31/2023       Anesthesia Plan    ASA Status:  1    NPO Status:  NPO Appropriate    Anesthesia Type: General.     - Airway: LMA   Induction: Intravenous, Propofol.   Maintenance: Balanced.        Consents    Anesthesia Plan(s) and associated risks, benefits, and realistic alternatives discussed. Questions answered and patient/representative(s) expressed understanding.     - Discussed:     - Discussed with:  Patient            Postoperative Care    Pain management: IV analgesics, Oral pain medications, Multi-modal analgesia.   PONV prophylaxis: Ondansetron (or other 5HT-3), Dexamethasone or Solumedrol     Comments:                Jose Juan Vidal MD  "

## 2024-02-02 ENCOUNTER — NURSE TRIAGE (OUTPATIENT)
Dept: NURSING | Facility: CLINIC | Age: 24
End: 2024-02-02
Payer: COMMERCIAL

## 2024-02-02 DIAGNOSIS — R49.0 HOARSENESS: Primary | ICD-10-CM

## 2024-02-02 NOTE — TELEPHONE ENCOUNTER
S: Pt calling. She states that she has been experiencing episodes in which her voice will go out when talking.     B: She states that this has been going on for over a year and it is intermittent.     A: Pt denies pain, swelling, or any other physical or concerning SXs but states that it is bothersome and others have commented on as well so would like to be seen with ENT for this.     Pt requesting referral to ENT.    R: Please call or MyChart pt to advise if PCP ok with referral or wants to see pt first.    Isabelle Hoffmann, RN, BSN  [facility] Nurse Advisor 4:22 PM 2/2/2024      Reason for Disposition   Requesting referral to a specialist    Additional Information   Negative: New-onset or worsening symptoms, see that protocol (e.g., diarrhea, runny nose, sore throat)   Negative: Medicine question not related to refill or renewal   Negative: Requesting a renewal or refill of a medicine patient is currently taking   Negative: Questions or concerns about high blood pressure   Negative: Nursing judgment   Negative: Nursing judgment   Negative: Nursing judgment   Negative: Requesting lab results and adult stable (no new symptoms, not worsening)    Protocols used: Information Only Call - No Triage-A-[place]

## 2024-02-07 NOTE — TELEPHONE ENCOUNTER
Referral sent.     Be seen however if develops urgent symptoms.        Healthy regards,            Gabriela Bright, FNP-BC

## 2024-02-07 NOTE — TELEPHONE ENCOUNTER
Called Pt and advised. Advised to be seen in ED/UC or call if any SOB, issues swallowing, swelling.     Michoacano Yancey RN [place] Triage

## 2024-04-01 DIAGNOSIS — Z30.41 ENCOUNTER FOR SURVEILLANCE OF CONTRACEPTIVE PILLS: ICD-10-CM

## 2024-04-01 DIAGNOSIS — N92.1 BREAKTHROUGH BLEEDING ON BIRTH CONTROL PILLS: ICD-10-CM

## 2024-04-01 RX ORDER — LEVONORGESTREL AND ETHINYL ESTRADIOL 0.1-0.02MG
1 KIT ORAL DAILY
Qty: 84 TABLET | Refills: 0 | Status: SHIPPED | OUTPATIENT
Start: 2024-04-01 | End: 2024-07-03

## 2024-07-02 DIAGNOSIS — N92.1 BREAKTHROUGH BLEEDING ON BIRTH CONTROL PILLS: ICD-10-CM

## 2024-07-02 DIAGNOSIS — Z30.41 ENCOUNTER FOR SURVEILLANCE OF CONTRACEPTIVE PILLS: ICD-10-CM

## 2024-07-02 NOTE — TELEPHONE ENCOUNTER
Please call patient and ask last communication she sent a message to us in April that she had a missed.    She is due for physical and needs seen. What is the status of her menstrual cycles at this time.   Set up physical will send enough pills until then route back to me.     Tezt sent in April   April 10, 2024  Yi Cm, RN   to Jaleesa Palmer         4/10/24  8:59 AM  Jaleesa,      Please call the clinic at [phone] to discuss your symptoms with a triage nurse.      Thank you      Yi BAILON, BSN  [facility] [place] Triage    Last read by Jaleesa Palmer at  9:00 AM on 4/10/2024.  April 9, 2024  Jaleesa Palmer   to [facility] (supporting You)         4/9/24 11:45 AM  Hello. I am just reaching out to ask a question about my birth control. I hope I don t need an appointment for this but I missed my period last week. I do not take my pills continuously so I always get a period, but last week when I was expecting one around the 27th I did not get it at all. This has never happened before so I took a pregnancy test a couple days ago and it was negative. I m wondering if I need to switch my prescription, or if it is ok to miss a period every once in a while.     Thank you!  April 8, 2024       AL    4/8/24  9:59 AM  You routed this conversation to [facility]  [facility]    4/8/24  9:59 AM  Note  Please fully triage how she is now-Pain, is pain one sided has she had menses now etc.      Will need appt and labs to assess.                     Gabriela Bright, FNP-BC                     Healthy regards,            Gabriela Bright, FNP-BC

## 2024-07-02 NOTE — TELEPHONE ENCOUNTER
Attempt # 1    Called #   Telephone Information:   Mobile [phone]       Left a non detailed VM     Yi Cm RN, BSN  [place] Triage

## 2024-07-03 RX ORDER — LEVONORGESTREL AND ETHINYL ESTRADIOL 0.1-0.02MG
1 KIT ORAL DAILY
Qty: 28 TABLET | Refills: 0 | Status: SHIPPED | OUTPATIENT
Start: 2024-07-03 | End: 2024-07-30

## 2024-07-05 NOTE — TELEPHONE ENCOUNTER
Called #   Telephone Information:   Mobile [phone]     Pt stated that she only missed her period that one time and she has been doing well since no cramping and having regular periods since     She made a PX appointment for 8/6/2024    Yi Cm RN, BSN  [place] Triage

## 2024-07-30 DIAGNOSIS — N92.1 BREAKTHROUGH BLEEDING ON BIRTH CONTROL PILLS: ICD-10-CM

## 2024-07-30 DIAGNOSIS — Z30.41 ENCOUNTER FOR SURVEILLANCE OF CONTRACEPTIVE PILLS: ICD-10-CM

## 2024-07-30 RX ORDER — LEVONORGESTREL AND ETHINYL ESTRADIOL 0.1-0.02MG
1 KIT ORAL DAILY
Qty: 28 TABLET | Refills: 0 | Status: SHIPPED | OUTPATIENT
Start: 2024-07-30 | End: 2024-08-06

## 2024-08-01 SDOH — HEALTH STABILITY: PHYSICAL HEALTH: ON AVERAGE, HOW MANY MINUTES DO YOU ENGAGE IN EXERCISE AT THIS LEVEL?: 20 MIN

## 2024-08-01 SDOH — HEALTH STABILITY: PHYSICAL HEALTH: ON AVERAGE, HOW MANY DAYS PER WEEK DO YOU ENGAGE IN MODERATE TO STRENUOUS EXERCISE (LIKE A BRISK WALK)?: 2 DAYS

## 2024-08-01 ASSESSMENT — SOCIAL DETERMINANTS OF HEALTH (SDOH): HOW OFTEN DO YOU GET TOGETHER WITH FRIENDS OR RELATIVES?: TWICE A WEEK

## 2024-08-06 ENCOUNTER — OFFICE VISIT (OUTPATIENT)
Dept: FAMILY MEDICINE | Facility: CLINIC | Age: 24
End: 2024-08-06
Payer: COMMERCIAL

## 2024-08-06 VITALS
RESPIRATION RATE: 11 BRPM | OXYGEN SATURATION: 100 % | BODY MASS INDEX: 25.62 KG/M2 | TEMPERATURE: 98.5 F | HEIGHT: 69 IN | WEIGHT: 173 LBS | HEART RATE: 98 BPM | SYSTOLIC BLOOD PRESSURE: 118 MMHG | DIASTOLIC BLOOD PRESSURE: 72 MMHG

## 2024-08-06 DIAGNOSIS — Z00.00 ROUTINE GENERAL MEDICAL EXAMINATION AT A HEALTH CARE FACILITY: Primary | ICD-10-CM

## 2024-08-06 DIAGNOSIS — Z12.4 CERVICAL CANCER SCREENING: ICD-10-CM

## 2024-08-06 DIAGNOSIS — Z30.41 ENCOUNTER FOR SURVEILLANCE OF CONTRACEPTIVE PILLS: ICD-10-CM

## 2024-08-06 PROCEDURE — 99395 PREV VISIT EST AGE 18-39: CPT | Performed by: NURSE PRACTITIONER

## 2024-08-06 PROCEDURE — G0145 SCR C/V CYTO,THINLAYER,RESCR: HCPCS | Performed by: NURSE PRACTITIONER

## 2024-08-06 RX ORDER — LEVONORGESTREL/ETHIN.ESTRADIOL 0.1-0.02MG
1 TABLET ORAL DAILY
Qty: 28 TABLET | Refills: 0 | Status: SHIPPED | OUTPATIENT
Start: 2024-08-06 | End: 2024-08-06

## 2024-08-06 RX ORDER — LEVONORGESTREL/ETHIN.ESTRADIOL 0.1-0.02MG
1 TABLET ORAL DAILY
Qty: 84 TABLET | Refills: 4 | Status: SHIPPED | OUTPATIENT
Start: 2024-08-06

## 2024-08-06 NOTE — PROGRESS NOTES
"Preventive Care Visit  [facility] [place]  LIGIA Watts CNP, Nurse Practitioner - Family  Aug 6, 2024      Assessment & Plan     Routine general medical examination at a health care facility  Well woman exam with breast exam and Pap smear completed today.      - REVIEW OF HEALTH MAINTENANCE PROTOCOL ORDERS    Cervical cancer screening    - Pap Screen Reflex to HPV if ASCUS - Recommended Age 25 - 29 Years    Encounter for surveillance of contraceptive pills  No concerns.  Stable.  Continue same medication this was refilled today.   - levonorgestrel-ethinyl estradiol (FALMINA) 0.1-20 MG-MCG tablet  Dispense: 84 tablet; Refill: 4      Patient has been advised of split billing requirements and indicates understanding: Yes        BMI  Estimated body mass index is 25.19 kg/m  as calculated from the following:    Height as of this encounter: 1.765 m (5' 9.49\").    Weight as of this encounter: 78.5 kg (173 lb).       Counseling  Appropriate preventive services were addressed with this patient.    Return in about 1 year (around 8/6/2025) for wellness exam.       Valentin Lazcano is a 24 year old, presenting for the following:  Physical and Recheck Medication        8/6/2024     7:14 AM   Additional Questions   Roomed by Darrick HALE   Accompanied by Self        Health Care Directive  Patient does not have a Health Care Directive or Living Will: Discussed advance care planning with patient; however, patient declined at this time.    HPI    Medication Followup of Falmina - Birth Control  Taking Medication as prescribed: yes  Side Effects:  None  Medication Helping Symptoms:  yes      On ocp since 2017.  Sexually active not a new partner.  Last Pap [facility] in [place] - got JENNY to fax sent LAST year will resend                8/1/2024   General Health   How would you rate your overall physical health? Good   Feel stress (tense, anxious, or unable to sleep) Only a little      (!) STRESS " CONCERN      8/1/2024   Nutrition   Three or more servings of calcium each day? Yes   Diet: Regular (no restrictions)   How many servings of fruit and vegetables per day? (!) 2-3   How many sweetened beverages each day? 0-1            8/1/2024   Exercise   Days per week of moderate/strenous exercise 2 days   Average minutes spent exercising at this level 20 min      (!) EXERCISE CONCERN      8/1/2024   Social Factors   Frequency of gathering with friends or relatives Twice a week   Worry food won't last until get money to buy more No   Food not last or not have enough money for food? No   Do you have housing? (Housing is defined as stable permanent housing and does not include staying ouside in a car, in a tent, in an abandoned building, in an overnight shelter, or couch-surfing.) No   Are you worried about losing your housing? No   Lack of transportation? No   Unable to get utilities (heat,electricity)? No   Want help with housing or utility concern? No      (!) HOUSING CONCERN PRESENT      8/1/2024   Dental   Dentist two times every year? Yes            8/1/2024   TB Screening   Were you born outside of the [place]? No            Today's PHQ-2 Score:       8/5/2024     8:52 AM   PHQ-2 ( 1999 [employer])   Q1: Little interest or pleasure in doing things 0   Q2: Feeling down, depressed or hopeless 0   PHQ-2 Score 0   Q1: Little interest or pleasure in doing things Not at all   Q2: Feeling down, depressed or hopeless Not at all   PHQ-2 Score 0           8/1/2024   Substance Use   Alcohol more than 3/day or more than 7/wk No   Do you use any other substances recreationally? No        Social History     Tobacco Use    Smoking status: Never     Passive exposure: Never    Smokeless tobacco: Never   Vaping Use    Vaping status: Never Used   Substance Use Topics    Alcohol use: Yes     Comment: occas    Drug use: Yes     Types: Marijuana     Comment: occas       Mammogram Screening - Patient under 40 years of age: Routine Mammogram  "Screening not recommended.           8/1/2024   One time HIV Screening   Previous HIV test? I don't know          8/1/2024   STI Screening   New sexual partner(s) since last STI/HIV test? No        History of abnormal Pap smear: No - age 21-29 PAP every 3 years recommended             8/1/2024   Contraception/Family Planning   Questions about contraception or family planning (!) YES         Reviewed and updated as needed this visit by Provider   Tobacco  Allergies  Meds  Problems  Med Hx  Surg Hx  Fam Hx            Constitutional, HEENT, cardiovascular, pulmonary, GI, , musculoskeletal, neuro, skin, endocrine and psych systems are negative, except as otherwise noted in the HPI.          Objective    Exam  /72 (BP Location: Right arm, Patient Position: Chair, Cuff Size: Adult Regular)   Pulse 98   Temp 98.5  F (36.9  C) (Tympanic)   Resp 11   Ht 1.765 m (5' 9.49\")   Wt 78.5 kg (173 lb)   LMP 07/16/2024 (Exact Date)   SpO2 100%   BMI 25.19 kg/m     Estimated body mass index is 25.19 kg/m  as calculated from the following:    Height as of this encounter: 1.765 m (5' 9.49\").    Weight as of this encounter: 78.5 kg (173 lb).    Physical Exam  GENERAL: alert and no distress  EYES: Eyes grossly normal to inspection, PERRL and conjunctivae and sclerae normal  HENT: ear canals and TM's normal, nose and mouth without ulcers or lesions  NECK: no adenopathy, no asymmetry, masses, or scars  RESP: lungs clear to auscultation - no rales, rhonchi or wheezes  BREAST: normal without masses, tenderness or nipple discharge and no palpable axillary masses or adenopathy  CV: regular rate and rhythm, normal S1 S2, no S3 or S4, no murmur, click or rub, no peripheral edema  ABDOMEN: soft, nontender, no hepatosplenomegaly, no masses and bowel sounds normal   (female) w/bimanual: normal female external genitalia, normal urethral meatus, normal vaginal mucosa, and normal cervix/adnexa/uterus without masses or " discharge  MS: no gross musculoskeletal defects noted, no edema  SKIN: no suspicious lesions or rashes  NEURO: Normal strength and tone, mentation intact and speech normal  PSYCH: mentation appears normal, affect normal/bright         Signed Electronically by: LIGIA Watts CNP

## 2024-08-06 NOTE — PATIENT INSTRUCTIONS
Patient Education   Preventive Care Advice   This is general advice given by our system to help you stay healthy. However, your care team may have specific advice just for you. Please talk to your care team about your preventive care needs.  Nutrition  Eat 5 or more servings of fruits and vegetables each day.  Try wheat bread, brown rice and whole grain pasta (instead of white bread, rice, and pasta).  Get enough calcium and vitamin D. Check the label on foods and aim for 100% of the RDA (recommended daily allowance).  Lifestyle  Exercise at least 150 minutes each week  (30 minutes a day, 5 days a week).  Do muscle strengthening activities 2 days a week. These help control your weight and prevent disease.  No smoking.  Wear sunscreen to prevent skin cancer.  Have a dental exam and cleaning every 6 months.  Yearly exams  See your health care team every year to talk about:  Any changes in your health.  Any medicines your care team has prescribed.  Preventive care, family planning, and ways to prevent chronic diseases.  Shots (vaccines)   HPV shots (up to age 26), if you've never had them before.  Hepatitis B shots (up to age 59), if you've never had them before.  COVID-19 shot: Get this shot when it's due.  Flu shot: Get a flu shot every year.  Tetanus shot: Get a tetanus shot every 10 years.  Pneumococcal, hepatitis A, and RSV shots: Ask your care team if you need these based on your risk.  Shingles shot (for age 50 and up)  General health tests  Diabetes screening:  Starting at age 35, Get screened for diabetes at least every 3 years.  If you are younger than age 35, ask your care team if you should be screened for diabetes.  Cholesterol test: At age 39, start having a cholesterol test every 5 years, or more often if advised.  Bone density scan (DEXA): At age 50, ask your care team if you should have this scan for osteoporosis (brittle bones).  Hepatitis C: Get tested at least once in your life.  STIs (sexually  transmitted infections)  Before age 24: Ask your care team if you should be screened for STIs.  After age 24: Get screened for STIs if you're at risk. You are at risk for STIs (including HIV) if:  You are sexually active with more than one person.  You don't use condoms every time.  You or a partner was diagnosed with a sexually transmitted infection.  If you are at risk for HIV, ask about PrEP medicine to prevent HIV.  Get tested for HIV at least once in your life, whether you are at risk for HIV or not.  Cancer screening tests  Cervical cancer screening: If you have a cervix, begin getting regular cervical cancer screening tests starting at age 21.  Breast cancer scan (mammogram): If you've ever had breasts, begin having regular mammograms starting at age 40. This is a scan to check for breast cancer.  Colon cancer screening: It is important to start screening for colon cancer at age 45.  Have a colonoscopy test every 10 years (or more often if you're at risk) Or, ask your provider about stool tests like a FIT test every year or Cologuard test every 3 years.  To learn more about your testing options, visit:   .  For help making a decision, visit:   https://bit.ly/ah10645.  Prostate cancer screening test: If you have a prostate, ask your care team if a prostate cancer screening test (PSA) at age 55 is right for you.  Lung cancer screening: If you are a current or former smoker ages 50 to 80, ask your care team if ongoing lung cancer screenings are right for you.  For informational purposes only. Not to replace the advice of your health care provider. Copyright   2023 [employer]. All rights reserved. Clinically reviewed by the [facility] Transitions Program. [place] [id number] - REV 01/24.

## 2024-08-09 LAB
BKR LAB AP GYN ADEQUACY: NORMAL
BKR LAB AP GYN INTERPRETATION: NORMAL
BKR LAB AP HPV REFLEX: NORMAL
BKR LAB AP PREVIOUS ABNORMAL: NORMAL
PATH REPORT.COMMENTS IMP SPEC: NORMAL
PATH REPORT.COMMENTS IMP SPEC: NORMAL
PATH REPORT.RELEVANT HX SPEC: NORMAL

## 2024-08-09 NOTE — TELEPHONE ENCOUNTER
FUTURE VISIT INFORMATION      FUTURE VISIT INFORMATION:  Date: 8/29/24  Time: 2 PM  Location: [facility] - ENT  REFERRAL INFORMATION:  Referring provider:  Gabriela Bright APRN CNP   Referring providers clinic:  [facility]  Reason for visit/diagnosis:  New per pt-ref, Hoarseness [R49.0], ref'd by Gabriela Bright APRN CNP, ref-recs in epic, confirmed [facility]     RECORDS REQUESTED FROM      Clinic name Comments Records Status Imaging Status   [facility] 2/2/24 telephone referral -Gabriela Bright APRN CNP  Epic

## 2024-08-26 NOTE — PROGRESS NOTES
[facility]   at the [facility]   Otolaryngology Clinic     Patient: Jaleesa Palmer    MRN: [id number]    : 2000    Age/Gender: 24 year old female  Date of Service: 2024  Rendering Provider:   Niurka Argueta MD       Impression & Plan     IMPRESSION: Ms. Palmer is a 24 year old female who is being seen for the following:    Dysphonia  - ongoing for 2 years  - every week for 2 days of voice going out  - stable  - started gradually  - speaking voice is affected  - singing voice is affected  - yes pain with voice use - when she has the issue  - voice demand is moderate  - scope evaluation shows supraglottic hyperfunction  -symptoms due to muscle tension dysphonia but can't rule out spasmodic dypshonia at this time  - will start with voice therapy first   Plan  - voice therapy       RETURN VISIT: as needed after therapy    Referring Provider   PCP: Gabriela Bright  Referring Physician: Gabriela Bright, [facility]  [address] [place] [address]  Reason for Consultation   Dysphonia  History   HISTORY OF PRESENT ILLNESS: Ms. Palmer is a 24 year old female who presents to us today with dysphonia.         she presents today for evaluation. she reports:    Dysphonia  Voice Evaluation:    She reports the voice problem began 2 years ago and has   over time. She feels the problem was caused by voice use;don't know;other and it bothers her very much. More specifically, she has been experiencing  voice tires easily;voice cuts off;reduced loudness;change in speaking pitch;shaky voice;gravelly voice;worse with use;worse with stress;voice is inconsistent;voice is sometimes normal;problem with mid range. She has Routine - frequent periods of talking on most days, most talking is conversational speech vocal demands with working;heavy phone use;socializing;other.    Dysphagia  -denies     Dyspnea  - denies    Throat clearing/cough  - denies    GERD/LPRD   - denies    PAST  MEDICAL HISTORY: No past medical history on file.    PAST SURGICAL HISTORY:   Past Surgical History:   Procedure Laterality Date    MARSUPIALIZATION BARTHOLIN CYST Left 11/1/2023    Procedure: MARSUPIALIZATION, CYST, BARTHOLIN'S GLAND;  Surgeon: Tova Ramey DO;  Location: [facility] OR    wisdom teeth         CURRENT MEDICATIONS:   Current Outpatient Medications:     levonorgestrel-ethinyl estradiol (FALMINA) 0.1-20 MG-MCG tablet, Take 1 tablet by mouth daily, Disp: 84 tablet, Rfl: 4    ALLERGIES: Patient has no known allergies.    SOCIAL HISTORY:    Social History     Socioeconomic History    Marital status: Single     Spouse name: Not on file    Number of children: Not on file    Years of education: Not on file    Highest education level: Not on file   Occupational History    Not on file   Tobacco Use    Smoking status: Never     Passive exposure: Never    Smokeless tobacco: Never   Vaping Use    Vaping status: Never Used   Substance and Sexual Activity    Alcohol use: Yes     Comment: occas    Drug use: Yes     Types: Marijuana     Comment: occas    Sexual activity: Yes     Partners: Male     Birth control/protection: Condom, Pill   Other Topics Concern    Parent/sibling w/ CABG, MI or angioplasty before 65F 55M? No   Social History Narrative    Not on file     Social Determinants of Health     Financial Resource Strain: Low Risk  (8/1/2024)    Financial Resource Strain     Within the past 12 months, have you or your family members you live with been unable to get utilities (heat, electricity) when it was really needed?: No   Food Insecurity: Low Risk  (8/1/2024)    Food Insecurity     Within the past 12 months, did you worry that your food would run out before you got money to buy more?: No     Within the past 12 months, did the food you bought just not last and you didn t have money to get more?: No   Transportation Needs: Low Risk  (8/1/2024)    Transportation Needs     Within the past 12 months, has lack of  transportation kept you from medical appointments, getting your medicines, non-medical meetings or appointments, work, or from getting things that you need?: No   Physical Activity: Insufficiently Active (8/1/2024)    Exercise Vital Sign     Days of Exercise per Week: 2 days     Minutes of Exercise per Session: 20 min   Stress: No Stress Concern Present (8/1/2024)    [employer] [place] [employer] - Occupational Stress Questionnaire     Feeling of Stress : Only a little   Social Connections: Unknown (8/1/2024)    Social Connection and Isolation Panel [NHANES]     Frequency of Communication with Friends and Family: Not on file     Frequency of Social Gatherings with Friends and Family: Twice a week     Attends [employer] Services: Not on file     Active Member of Clubs or Organizations: Not on file     Attends Club or Organization Meetings: Not on file     Marital Status: Not on file   Interpersonal Safety: Low Risk  (8/6/2024)    Interpersonal Safety     Do you feel physically and emotionally safe where you currently live?: Yes     Within the past 12 months, have you been hit, slapped, kicked or otherwise physically hurt by someone?: No     Within the past 12 months, have you been humiliated or emotionally abused in other ways by your partner or ex-partner?: No   Housing Stability: High Risk (8/1/2024)    Housing Stability     Do you have housing? : No     Are you worried about losing your housing?: No         FAMILY HISTORY:   Family History   Problem Relation Age of Onset    Other Cancer Maternal Grandmother     Breast Cancer Other      Non-contributory for problems with anesthesia    REVIEW OF SYSTEMS:   The patient was asked a 14 point review of systems regarding constitutional symptoms, eye symptoms, ears, nose, mouth, throat symptoms, cardiovascular symptoms, respiratory symptoms, gastrointestinal symptoms, genitourinary symptoms, musculoskeletal symptoms, integumentary symptoms, neurological  symptoms, psychiatric symptoms, endocrine symptoms, hematologic/lymphatic symptoms, and allergic/ immunologic symptoms.   The pertinent factors have been included in the HPI and below.  Patient Supplied Answers to Review of Systems       No data to display                Physical Examination   The patient underwent a physical examination as described below. The pertinent positive and negative findings are summarized after the description of the examination.  Constitutional: The patient's developmental and nutritional status was assessed. The patient's voice quality was assessed.  Head and Face: The head and face were inspected for deformities. The sinuses were palpated. The salivary glands were palpated. Facial muscle strength was assessed bilaterally.  Eyes: Extraocular movements and primary gaze alignment were assessed.  Ears, Nose, Mouth and Throat: The ears and nose were examined for deformities. The nasal septum, mucosa, and turbinates were inspected by anterior rhinoscopy. The lips, teeth, and gums were examined for abnormalities. The oral mucosa, tongue, palate, tonsils, lateral and posterior pharynx were inspected for the presence of asymmetry or mucosal lesions.    Neck: The tracheal position was noted, and the neck mass palpated to determine if there were any asymmetries, abnormal neck masses, thyromegally, or thyroid nodules.  Respiratory: The nature of the breathing and chest expansion/symmetry was observed.  Cardiovascular: The patient was examined to determine the presence of any edema or jugular venous distension.  Abdomen: The contour of the abdomen was noted.  Lymphatic: The patient was examined for infraclavicular lymphadenopathy.  Musculoskeletal: The patient was inspected for the presence of skeletal deformities.  Extremities: The extremities were examined for any clubbing or cyanosis.  Skin: The skin was examined for inflammatory or neoplastic conditions.  Neurologic: The patient's orientation,  mood, and affect were noted. The cranial nerve  functions were examined.  Other pertinent positive and negative findings on physical examination:      OC/OP: no lesions, uvula midline, soft palate elevates symmetrically   Neck: no lesions, bilateral TH tenderness to palpation    All other physical examination findings were within normal limits and noncontributory.  Procedures   Video Laryngoscopy with Stroboscopy (CPT 56724)    Preoperative Diagnosis:  Dysphonia and throat symptoms  Postoperative Diagnosis: Dysphonia and throat symptoms  Indication:  Patient has new or persistent dysphonia and throat symptoms.  This requires evaluation by stroboscopy to fully delineate the laryngeal functioning; especially mucosal wave assessment, ultrasharp visualization of lesions on the vocal folds, and overall functioning of the larynx.  Details of Procedure: A 70 degree rigid telescopic laryngoscope or a distal chip flexible scope was used. The lighting was obtained via a light cable connected to a stroboscopic unit. The telescope was inserted either transorally or transnasally until the vocal folds could be visualized. The patient was instructed to sustain the vowel  ee  at a comfortable pitch and loudness as the voice was monitored by a microphone connected to a fundamental frequency tracker. This circuit tracked vocal periodicity, allowing the light to flash in synchrony with the glottal cycles. A setting on the stroboscope was set to change the phase of light strobing with relation to the vocal fundamental frequency, producing an image of 1 to 2 glottal cycles every second. The video images were recorded for analysis. Use of the variable speed, slow and stop scan allowed careful study of pertinent segments of laryngeal function to increase accuracy of clinical assessments of function and dysfunction.  In particular, features of glottal closure, mucosal wave on the vocal fold cover and laryngeal symmetry were analyzed.  "Lastly, the patient was asked to phonate speech samples and auditory/perceptual evaluation of voice and resonance were performed.  The vocal quality was carefully evaluated for hoarseness, breathiness, loudness, phrase length and intelligibility to determine the source of dysphonia.    Findings:      B. LARYNGOVIDEOSTROBOSCOPY   Anatomic/Physiological Deviations:  RNC, supraglottic hyperfunction   Mucosal wave: Right:  No restriction     Left: No restriction  Bilateral Vocal Fold Vibration: Asymmetric  Vocal Process: Right: No restriction    Left:  No restriction  Vocal Fold closure: Complete glottal closure    Complication(s): None  Disposition: Patient tolerated the procedure well              Review of Relevant Clinical Data   I personally reviewed:  Notes:   2/2/24 telephone referral -Gabriela Bright APRN CNP   Called Pt and advised. Advised to be seen in ED/UC or call if any SOB, issues swallowing, swelling.      Michoacano Yancey RN [place] Triage    Labs:  Lab Results   Component Value Date    TSH 4.77 (H) 07/31/2023     No results found for: \"NA\", \"CO2\", \"BUN\", \"CREAT\", \"GLUCOSE\", \"PHOS\"  Lab Results   Component Value Date    WBC 5.1 07/31/2023    HGB 14.4 07/31/2023    HCT 43.9 07/31/2023    MCV 94 07/31/2023     07/31/2023     No results found for: \"PT\", \"PTT\", \"INR\"  No results found for: \"ROSS\"  No components found for: \"RHEUMATOIDFACTOR\", \"RF\"  No results found for: \"CRP\"  No components found for: \"CKTOT\", \"URICACID\"  No components found for: \"C3\", \"C4\", \"DSDNAAB\", \"NDNAABIFA\"  No results found for: \"MPOAB\"    Patient reported Quality of Life (QOL) Measures   Patient Supplied Answers To VHI Questionnaire       No data to display                  Patient Supplied Answers To EAT Questionnaire       No data to display                  Patient Supplied Answers To CSI Questionnaire       No data to display                  Patient Supplied Answers to Dyspnea Index Questionnaire:       No data " to display              Scribe Disclosure:   I, Estelita Ceja, am serving as a scribe; to document services personally performed by Niurka Argueta MD -based on data collection and the provider's statements to me.     Provider Disclosure:  I agree with above History, Review of Systems, Physical exam and Plan.  I have reviewed the content of the documentation and have edited it as needed. I have personally performed the services documented here and the documentation accurately represents those services and the decisions I have made.     Thank you for the kind referral and for allowing me to share in the care of Ms. Palmer. If you have any questions, please do not hesitate to contact me.    Niurka Argueta MD    Laryngology    [facility] Voice [facility]  Department of  Otolaryngology - Head and Neck Surgery  Clinics & [place] [address]  Appointment line: [phone]  Fax: [phone]  [email or web address]

## 2024-08-29 ENCOUNTER — OFFICE VISIT (OUTPATIENT)
Dept: OTOLARYNGOLOGY | Facility: CLINIC | Age: 24
End: 2024-08-29
Payer: COMMERCIAL

## 2024-08-29 ENCOUNTER — PRE VISIT (OUTPATIENT)
Dept: OTOLARYNGOLOGY | Facility: CLINIC | Age: 24
End: 2024-08-29

## 2024-08-29 VITALS
HEIGHT: 69 IN | WEIGHT: 179 LBS | SYSTOLIC BLOOD PRESSURE: 142 MMHG | HEART RATE: 104 BPM | DIASTOLIC BLOOD PRESSURE: 81 MMHG | BODY MASS INDEX: 26.51 KG/M2

## 2024-08-29 DIAGNOSIS — R49.0 HOARSENESS: Primary | ICD-10-CM

## 2024-08-29 DIAGNOSIS — R49.0 DYSPHONIA: ICD-10-CM

## 2024-08-29 DIAGNOSIS — R49.0 DYSPHONIA: Primary | ICD-10-CM

## 2024-08-29 PROCEDURE — 92524 BEHAVRAL QUALIT ANALYS VOICE: CPT | Mod: GN | Performed by: SPEECH-LANGUAGE PATHOLOGIST

## 2024-08-29 PROCEDURE — 31579 LARYNGOSCOPY TELESCOPIC: CPT | Performed by: OTOLARYNGOLOGY

## 2024-08-29 PROCEDURE — 99203 OFFICE O/P NEW LOW 30 MIN: CPT | Mod: 25 | Performed by: OTOLARYNGOLOGY

## 2024-08-29 NOTE — LETTER
2024       RE: Jaleesa Palmer  [address] [facility] [address]     Dear Colleague,    Thank you for referring your patient, Jaleesa Palmer, to the [facility] [place] at [facility]. Please see a copy of my visit note below.        [facility]   at the [facility]   Otolaryngology Clinic     Patient: Jaleesa Palmer    MRN: [id number]    : 2000    Age/Gender: 24 year old female  Date of Service: 2024  Rendering Provider:   Niurka Argueta MD       Impression & Plan     IMPRESSION: Ms. Palmer is a 24 year old female who is being seen for the following:    Dysphonia  - ongoing for 2 years  - every week for 2 days of voice going out  - stable  - started gradually  - speaking voice is affected  - singing voice is affected  - yes pain with voice use - when she has the issue  - voice demand is moderate  - scope evaluation shows supraglottic hyperfunction  -symptoms due to muscle tension dysphonia but can't rule out spasmodic dypshonia at this time  - will start with voice therapy first   Plan  - voice therapy       RETURN VISIT: as needed after therapy    Referring Provider   PCP: Gabriela Bright  Referring Physician: LIGIA Slater CNP  [address] [place] [address]  Reason for Consultation   Dysphonia  History   HISTORY OF PRESENT ILLNESS: Ms. Palmer is a 24 year old female who presents to us today with dysphonia.         she presents today for evaluation. she reports:    Dysphonia  Voice Evaluation:    She reports the voice problem began 2 years ago and has   over time. She feels the problem was caused by voice use;don't know;other and it bothers her very much. More specifically, she has been experiencing  voice tires easily;voice cuts off;reduced loudness;change in speaking pitch;shaky voice;gravelly voice;worse with use;worse with stress;voice is  inconsistent;voice is sometimes normal;problem with mid range. She has Routine - frequent periods of talking on most days, most talking is conversational speech vocal demands with working;heavy phone use;socializing;other.    Dysphagia  -denies     Dyspnea  - denies    Throat clearing/cough  - denies    GERD/LPRD   - denies    PAST MEDICAL HISTORY: No past medical history on file.    PAST SURGICAL HISTORY:   Past Surgical History:   Procedure Laterality Date     MARSUPIALIZATION BARTHOLIN CYST Left 11/1/2023    Procedure: MARSUPIALIZATION, CYST, BARTHOLIN'S GLAND;  Surgeon: Tova Ramey DO;  Location: [facility] OR     wisdom teeth         CURRENT MEDICATIONS:   Current Outpatient Medications:      levonorgestrel-ethinyl estradiol (FALMINA) 0.1-20 MG-MCG tablet, Take 1 tablet by mouth daily, Disp: 84 tablet, Rfl: 4    ALLERGIES: Patient has no known allergies.    SOCIAL HISTORY:    Social History     Socioeconomic History     Marital status: Single     Spouse name: Not on file     Number of children: Not on file     Years of education: Not on file     Highest education level: Not on file   Occupational History     Not on file   Tobacco Use     Smoking status: Never     Passive exposure: Never     Smokeless tobacco: Never   Vaping Use     Vaping status: Never Used   Substance and Sexual Activity     Alcohol use: Yes     Comment: occas     Drug use: Yes     Types: Marijuana     Comment: occas     Sexual activity: Yes     Partners: Male     Birth control/protection: Condom, Pill   Other Topics Concern     Parent/sibling w/ CABG, MI or angioplasty before 65F 55M? No   Social History Narrative     Not on file     Social Determinants of Health     Financial Resource Strain: Low Risk  (8/1/2024)    Financial Resource Strain      Within the past 12 months, have you or your family members you live with been unable to get utilities (heat, electricity) when it was really needed?: No   Food Insecurity: Low Risk  (8/1/2024)     Food Insecurity      Within the past 12 months, did you worry that your food would run out before you got money to buy more?: No      Within the past 12 months, did the food you bought just not last and you didn t have money to get more?: No   Transportation Needs: Low Risk  (8/1/2024)    Transportation Needs      Within the past 12 months, has lack of transportation kept you from medical appointments, getting your medicines, non-medical meetings or appointments, work, or from getting things that you need?: No   Physical Activity: Insufficiently Active (8/1/2024)    Exercise Vital Sign      Days of Exercise per Week: 2 days      Minutes of Exercise per Session: 20 min   Stress: No Stress Concern Present (8/1/2024)    [employer] [place] [employer] - Occupational Stress Questionnaire      Feeling of Stress : Only a little   Social Connections: Unknown (8/1/2024)    Social Connection and Isolation Panel [NHANES]      Frequency of Communication with Friends and Family: Not on file      Frequency of Social Gatherings with Friends and Family: Twice a week      Attends Christianity Services: Not on file      Active Member of Clubs or Organizations: Not on file      Attends Club or Organization Meetings: Not on file      Marital Status: Not on file   Interpersonal Safety: Low Risk  (8/6/2024)    Interpersonal Safety      Do you feel physically and emotionally safe where you currently live?: Yes      Within the past 12 months, have you been hit, slapped, kicked or otherwise physically hurt by someone?: No      Within the past 12 months, have you been humiliated or emotionally abused in other ways by your partner or ex-partner?: No   Housing Stability: High Risk (8/1/2024)    Housing Stability      Do you have housing? : No      Are you worried about losing your housing?: No         FAMILY HISTORY:   Family History   Problem Relation Age of Onset     Other Cancer Maternal Grandmother      Breast Cancer Other       Non-contributory for problems with anesthesia    REVIEW OF SYSTEMS:   The patient was asked a 14 point review of systems regarding constitutional symptoms, eye symptoms, ears, nose, mouth, throat symptoms, cardiovascular symptoms, respiratory symptoms, gastrointestinal symptoms, genitourinary symptoms, musculoskeletal symptoms, integumentary symptoms, neurological symptoms, psychiatric symptoms, endocrine symptoms, hematologic/lymphatic symptoms, and allergic/ immunologic symptoms.   The pertinent factors have been included in the HPI and below.  Patient Supplied Answers to Review of Systems       No data to display                Physical Examination   The patient underwent a physical examination as described below. The pertinent positive and negative findings are summarized after the description of the examination.  Constitutional: The patient's developmental and nutritional status was assessed. The patient's voice quality was assessed.  Head and Face: The head and face were inspected for deformities. The sinuses were palpated. The salivary glands were palpated. Facial muscle strength was assessed bilaterally.  Eyes: Extraocular movements and primary gaze alignment were assessed.  Ears, Nose, Mouth and Throat: The ears and nose were examined for deformities. The nasal septum, mucosa, and turbinates were inspected by anterior rhinoscopy. The lips, teeth, and gums were examined for abnormalities. The oral mucosa, tongue, palate, tonsils, lateral and posterior pharynx were inspected for the presence of asymmetry or mucosal lesions.    Neck: The tracheal position was noted, and the neck mass palpated to determine if there were any asymmetries, abnormal neck masses, thyromegally, or thyroid nodules.  Respiratory: The nature of the breathing and chest expansion/symmetry was observed.  Cardiovascular: The patient was examined to determine the presence of any edema or jugular venous distension.  Abdomen: The contour  of the abdomen was noted.  Lymphatic: The patient was examined for infraclavicular lymphadenopathy.  Musculoskeletal: The patient was inspected for the presence of skeletal deformities.  Extremities: The extremities were examined for any clubbing or cyanosis.  Skin: The skin was examined for inflammatory or neoplastic conditions.  Neurologic: The patient's orientation, mood, and affect were noted. The cranial nerve  functions were examined.  Other pertinent positive and negative findings on physical examination:      OC/OP: no lesions, uvula midline, soft palate elevates symmetrically   Neck: no lesions, bilateral TH tenderness to palpation    All other physical examination findings were within normal limits and noncontributory.  Procedures   Video Laryngoscopy with Stroboscopy (CPT 77167)    Preoperative Diagnosis:  Dysphonia and throat symptoms  Postoperative Diagnosis: Dysphonia and throat symptoms  Indication:  Patient has new or persistent dysphonia and throat symptoms.  This requires evaluation by stroboscopy to fully delineate the laryngeal functioning; especially mucosal wave assessment, ultrasharp visualization of lesions on the vocal folds, and overall functioning of the larynx.  Details of Procedure: A 70 degree rigid telescopic laryngoscope or a distal chip flexible scope was used. The lighting was obtained via a light cable connected to a stroboscopic unit. The telescope was inserted either transorally or transnasally until the vocal folds could be visualized. The patient was instructed to sustain the vowel  ee  at a comfortable pitch and loudness as the voice was monitored by a microphone connected to a fundamental frequency tracker. This circuit tracked vocal periodicity, allowing the light to flash in synchrony with the glottal cycles. A setting on the stroboscope was set to change the phase of light strobing with relation to the vocal fundamental frequency, producing an image of 1 to 2 glottal  "cycles every second. The video images were recorded for analysis. Use of the variable speed, slow and stop scan allowed careful study of pertinent segments of laryngeal function to increase accuracy of clinical assessments of function and dysfunction.  In particular, features of glottal closure, mucosal wave on the vocal fold cover and laryngeal symmetry were analyzed. Lastly, the patient was asked to phonate speech samples and auditory/perceptual evaluation of voice and resonance were performed.  The vocal quality was carefully evaluated for hoarseness, breathiness, loudness, phrase length and intelligibility to determine the source of dysphonia.    Findings:      B. LARYNGOVIDEOSTROBOSCOPY   Anatomic/Physiological Deviations:  RNC, supraglottic hyperfunction   Mucosal wave: Right:  No restriction     Left: No restriction  Bilateral Vocal Fold Vibration: Asymmetric  Vocal Process: Right: No restriction    Left:  No restriction  Vocal Fold closure: Complete glottal closure    Complication(s): None  Disposition: Patient tolerated the procedure well              Review of Relevant Clinical Data   I personally reviewed:  Notes:   2/2/24 telephone referral -Gabriela Bright APRN CNP   Called Pt and advised. Advised to be seen in ED/UC or call if any SOB, issues swallowing, swelling.      Michoacano Yancey RN [facility] Triage    Labs:  Lab Results   Component Value Date    TSH 4.77 (H) 07/31/2023     No results found for: \"NA\", \"CO2\", \"BUN\", \"CREAT\", \"GLUCOSE\", \"PHOS\"  Lab Results   Component Value Date    WBC 5.1 07/31/2023    HGB 14.4 07/31/2023    HCT 43.9 07/31/2023    MCV 94 07/31/2023     07/31/2023     No results found for: \"PT\", \"PTT\", \"INR\"  No results found for: \"ROSS\"  No components found for: \"RHEUMATOIDFACTOR\", \"RF\"  No results found for: \"CRP\"  No components found for: \"CKTOT\", \"URICACID\"  No components found for: \"C3\", \"C4\", \"DSDNAAB\", \"NDNAABIFA\"  No results found for: \"MPOAB\"    Patient " reported Quality of Life (QOL) Measures   Patient Supplied Answers To VHI Questionnaire       No data to display                  Patient Supplied Answers To EAT Questionnaire       No data to display                  Patient Supplied Answers To CSI Questionnaire       No data to display                  Patient Supplied Answers to Dyspnea Index Questionnaire:       No data to display              Scribe Disclosure:   I, Estelita Ceja, am serving as a scribe; to document services personally performed by Niurka Argueta MD -based on data collection and the provider's statements to me.     Provider Disclosure:  I agree with above History, Review of Systems, Physical exam and Plan.  I have reviewed the content of the documentation and have edited it as needed. I have personally performed the services documented here and the documentation accurately represents those services and the decisions I have made.     Thank you for the kind referral and for allowing me to share in the care of Ms. Palmer. If you have any questions, please do not hesitate to contact me.    Niurka Argueta MD    Laryngology    [facility]  Department of  Otolaryngology - Head and Neck Surgery  Clinics [place] [address]  Appointment line: [phone]  Fax: [phone]  [email or web address]      Again, thank you for allowing me to participate in the care of your patient.      Sincerely,    Niurka Argueta MD

## 2024-08-29 NOTE — PATIENT INSTRUCTIONS
1.  You were seen in the ENT Clinic today by Dr. Argueta. If you have any questions or concerns after your appointment, please call [phone]. Press option #1 for scheduling related needs. Press option #3 for Nurse advice.    2.  Dr. Argueta has recommended the following:   - voice therapy    3.  Plan is to return to clinic as needed after therapy    How to Contact Us:  Send a [employer] message to your provider. Our team will respond to you via [employer]. Occasionally, we will need to call you to get further information.  For urgent matters (Monday-Friday, 8:00 AM-3:30 PM), call the ENT Clinic: [phone] and speak with a call center team member - they will route your call appropriately.   If you'd like to speak directly with a nurse, please call [phone]. We do our best to check voicemail frequently throughout the day, and will work to call you back within 1-2 days. For urgent matters, please use the general clinic phone numbers listed above.      Bel Sigala  [phone]  [facility] Otolaryngology

## 2024-08-29 NOTE — PATIENT INSTRUCTIONS
Welcome to the [facility]    We are a multidisciplinary team of Laryngologists, Speech Language Pathologists, Nurses, and other clinical support staff who specialize in the care of Voice, Breathing, Swallowing, and other disorders that affect the upper airway. Our mission is to serve our community through world-class clinical care, vital research, and education / outreach. We are part of the [facility] Department of Otolaryngology Head and Neck Surgery, and the clinic's initial founding was generously supported by the local chapter of the [employer].  We are currently a [facility] clinic housed within the Clinics and Surgery Center on the [facility].     To learn more about our clinic and its providers, please visit [facility]. For scheduling needs call [phone] to reach the ENT call center. For more specific information related to your evaluation with our clinic today please keep reading below. Should you have any additional questions, don't hesitate to reach out to our team via [employer].     We deeply appreciate you trusting us with your care.    - [facility] Team        Muscle Tension Dysphonia    One of the most common voice disorders we treat at the [facility] is Muscle Tension Dysphonia (MTD).  Although this may sound dramatic, it is just a medically specific way of saying that the sound of a person's voice is negatively affected by the way the muscles involved in voicing are working together.  It is what is known as a functional disorder, meaning that there is nothing structurally wrong with a person's larynx.  Rather the muscles and systems which allow us to breathe, voice, and shape that sound are out of balance. For many of us, not only does our voice play a large role in our daily lives, but it is part of our sense of self, so the overall impact of MTD cannot be overstated.  Symptoms of Muscle Tension  Dysphonia  Individuals experience MTD in very different ways including:  Effects on overall voice quality  patients describe their voice as rough, hoarse, gravelly, raspy, breathy, hollow, backward, shaky, halting, pitch changes, etc  Effects on daily function  Increased effort to use voice, reduced endurance, voice breaks / cutting out, decreased loudness, total loss of voice  Changes to singing voice  Loss of range, rapid fatigue, loss of vocal agility, decreased ease even with  easy  repertoire  Other effects on the throat  Throat discomfort or pain, frequent throat clearing, neck tenderness, sensation of a lump in the throat     Causes of Muscle Tension Dysphonia  There are many specific, individual reasons why use of the vocal mechanism becomes abnormal.  Some common causes are prolonged illness, longstanding patterns of overuse, and trauma, such as a physical injury, chemical exposure, or an emotionally traumatic event. Often the inciting event causes a person to adapt how they are using their voice temporarily, and even when the initial event is resolved these patterns persist, taking on a life of their own, yielding problems as listed above.     The onset of MTD can also be very subtle. Small amounts of added effort to achieve what feels like a stronger or clearer voice contributes to a vicious cycle in which more and more effort is required.  This cycle may continue for months or even years before the individual becomes aware that there is a problem with the voice.    The reality is, most of the time when we see people in clinic, the onset of their voice issue is long in the past or otherwise unclear. As a result we are often not able to say for certain how these patterns came to be, but thankfully that does not make their treatment any less effective.    Diagnosing Muscle Tension Dysphonia  A thorough evaluation is required in order to diagnose MTD. This involves skilled clinicians listening to a patient's  voice across a variety of tasks, visually evaluating the structure and function of the larynx via nasal endoscopy, and may also utilize aerodynamic and acoustic assessment.   Among other factors the laryngologist and speech language pathologist are looking for patterns of muscular involvement beyond what is needed for clear sound. This frequently results in a squeezed or compressed appearance limiting the view of the vocal folds, often corresponding to the patient's experience of vocal effort.  Treatment of Muscle Tension Dysphonia  The primary treatment for MTD is functional voice therapy, and in nearly all cases it is the only form of treatment a patient will need. The amount of time required to complete functional therapy averages between 4 and 8 sessions, though each individual is unique, and their journey toward vocal health may require more or less time. In cases of emotional stress, counseling or stress management may be helpful or even necessary.    On very rare occasions other medical interventions may be warranted. However, these are never used as a first line of intervention, and are best discussed between patient, speech pathologist, and laryngologist when and if the need arises.       Functional Speech Therapy   with the [facility] Speech Team    What is functional speech therapy?    Speech therapy to address issues around voice, breathing, cough, and other throat symptoms can be tremendously beneficial. All of the [facility] Speech Language Pathologists (SLP) are specially trained to help individuals with a large range of diagnosis and disorders encompassing:      Structural changes to the larynx - nodules, polyps, granulomas, etc   Issues affecting the nerves of the larynx - vocal fold paralysis, laryngeal dystonia, etc  Inefficient or damaging patterns of muscle use in the larynx and subsystems of voice and breathing    Our larynx (voice box) lives at the intersection of not only  voice, but breathing, swallowing, and cough as well.  A change to any one of those can result in an imbalance that affects the others. For example a person who coughs when they breathe deeply might adapt by taking smaller breaths, but then their voice (which relies on steady generous airflow) becomes hoarse.  Or, an individual with a small polyp on their vocal folds feels like they have to  force  their voice out, and this extra effort winds up causing throat pain and swallowing discomfort. It is our job as specialists in functional therapy to help tease apart these threads and find the most efficient patterns of muscle use possible, which can resolve symptoms and give the body the opportunity to heal fully.     Basics of functional speech therapy    If you are receiving this handout, it's likely that you have already had a thorough evaluation with one of the Physicians and/or Speech Language Pathologists (SLP) at the [facility].  This is a crucial first step to identifying where help is needed. From that point forward, functional therapy takes place in a course of personalized sessions (each approximately 45 minutes in length) with your SLP. The exact number of sessions is specific to each patient, but your speech pathologist will give you a sense of what to expect based on your specific diagnoses and what they observed during your evaluation.  These sessions are scheduled over the course of several months, often spacing out from weekly or bi-weekly sessions to once per month or beyond. Changing the patterns of how we use our throats is like any physical activity, which means that practice is delarosa.  The responsibility will be on you to practice the techniques you were given between sessions.  The strategies which prove most helpful will be advanced each time you are seen, and even the techniques which don't seem as useful will guide your clinician on which avenues to try next. The good news is if you stay  diligent, most patients notice improvement within the first 3 sessions.     Health insurance coverage for functional speech therapy    The ability to use your voice, breathe comfortably during athletics, or live without throat pain is considered a normal function.  If something is disordered, restoring it is considered medically necessary.  Most insurance companies recognize this, and therapy is covered under most policies.    The functional therapy offered at [facility] is a form of speech therapy.  If your insurance policy covers rehabilitation services such as physical therapy and speech therapy, your therapy is likely covered. Remember,  covered  does not mean that there is no cost as deductibles or copays may apply.   We encourage you to check both coverage and cost with your insurance company so that you don't get any unwanted surprises.  Remember to ask about SPEECH therapy, not VOICE therapy as insurance companies may confuse this with something uncovered like  voice lessons .  They will very likely want to know the procedure (CPT) code for the service, as well as any diagnosis codes (Dx) associated with the therapy.     [facility] Procedure codes  Treatment: this is the code associated with your therapy sessions  CPT 56599    Evaluation: these codes may be associated with your initial evaluation, and may be used again if you and your clinician decide repeat evaluation is needed during the course of therapy  CPT 62417 - Perceptual Evaluation of Voice and Resonance   CPT 95500 - Aerodynamic and Acoustic assessment of Laryngeal Function  CPT 81572 - Laryngeal Endoscopy with Videostroboscopy    Diagnosis (ICD-10) Codes - Based on your evaluation    Dysphonia (R49.0)

## 2024-08-29 NOTE — PROGRESS NOTES
[facility]  Evaluation report    Clinician: Sen Small M.M., M.A., CCC/SLP  Seen in conjunction with: Dr. Argueta  Referring physician:  Dr. Bright  Patient: Jaleesa Palmer  Date of Visit: 8/29/2024    HISTORY  Chief complaint: Jaleesa Palmer is a 24 year old woman presenting today for evaluation of voice.    Chart Review: Patient called primary care in February 2024 describing intermittent voice loss that had been going on for over a year.  She denied other physical symptoms pain or swelling, and asked for referral to ENT after which today's appointment was generated.  Today she reports symptoms began 2 years ago without clear inciting incident and have been worsening in their frequency across time.  She never had any issues with her voice before that point.  Symptoms are still intermittent but may be more likely to present if she projects her voice.  There are no other consistent triggers that she has noted.  She notes significant disruption approximately 2 days/week.    CURRENT SYMPTOMS PER PATIENT REPORT:  VOICE  Has issues 2 days per week  A few minutes where her voice will cut out or just not sound like her (enough to elicit comments from others)  More frequently across time  Voice can be gravelly sometimes but this feels separate  Vocal demand:   with MPD  Frequent voice use on phones talking to people  Quiet person in general  Social voice    COUGH/THROAT CLEARING  Frequent throat clearing after meals   Whole family has this  More happens with full meals    ADDITIONAL  Throat discomfort  Same timelines as voice  Localized near the thyrohyoid / suprahyoid region bilaterally  Presents at the same time as voice    Patient denies significant dyspnea, dysphagia, and cough.     OTHER PERTINENT HISTORY  Otherwise healthy    No past medical history on file.  Past Surgical History:   Procedure Laterality Date    MARSUPIALIZATION BARTHOLIN CYST Left 11/1/2023    Procedure:  "MARSUPIALIZATION, CYST, BARTHOLIN'S GLAND;  Surgeon: Tova Ramey DO;  Location: [facility] OR    wisdom teeth         OBJECTIVE  PATIENT REPORTED MEASURES    Patient Supplied Answers To VHI Questionnaire      8/26/2024     3:08 PM   Voice Handicap Index (VHI-10)   My voice makes it difficult for people to hear me 2   People have difficulty understanding me in a noisy room 2   My voice difficulties restrict my personal and social life.  2   I feel left out of conversations because of my voice 0   My voice problem causes me to lose income 0   I feel as though I have to strain to produce voice 2   The clarity of my voice is unpredictable 2   My voice problem upsets me 4   My voice makes me feel handicapped 0   People ask, \"What's wrong with your voice?\" 2   VHI-10 16     PERCEPTUAL EVALUATION (CPT 00776)  POSTURE / TENSION:   No overt tension    BREATHING:   phonation is not coordinated with respiration    LARYNGEAL PALPATION:   tenderness of the thyrohyoid area  reduced thyrohyoid space    VOICE:  Roughness: Mild to moderate Intermittent in the form of glottal talavera  Breathiness: WNL  Strain: Minimal  Loudness  Conversational speech:  WNL  Projected speech:  Mild to moderately reduced with significant strain on the attempt  Pitch:  Conversational speech:  WNL but perceived as low due to roughness  Pitch glide: Adequate access to loft registration with decreased strain as pitch increases  Resonance:  Conversational speech:  laryngeal pharyngeal resonance  Singing vs. Speech: Decreased strain in higher register singing versus running speech  SPEECH TASKS:  Intermittent spasmlike disruptions noted most frequently on voiced sounds (primarily initial)  Patient was mostly consistent in reporting of voice initial stimuli were more effortful than voiceless initial stimuli  CAPE-V Overall Severity:  36/100    COUGH/THROAT CLEARING:  Not observed    THERAPY PROBES: Improvement was elicited with higher pitch and higher " "flow    Laryngeal function studies are warranted, but patient expressed concern for cost of additional testing at this time, and so this is deferred per patient request.  A benchmark audio recording was gathered.    LARYNGEAL EXAMINATION  Procedure: Flexible endoscopy with chip-tip technology with stroboscopy, right nostril; topical anesthesia with 3% Lidocaine and 0.25% phenylephrine was applied.   Performed by: Dr. Niurka Argueta  The laryngeal and pharyngeal structures were evaluated for gross appearance, mobility, function, and focal lesions / abnormalities of the associated mucosa.  Stroboscopy was warranted to evaluate closure, symmetry, and vibratory characteristics of the vocal folds.  All findings were within normal limits with the exception of the following salient features:   Mild medial arytenoid wall irritation  Vocal folds appear smooth and straight without focal lesion or abnormality  With phonatory tasks there is severe anterior posterior supraglottic recruitment with a nearly commensurate degree of medial lateral compression  With higher flow contexts and particularly \"Ran Mouse voice\" decreased hyperfunction is seen  No discrete spasm was seen with voiced initial stimuli though increased hyperfunction is notable in this context  Stroboscopy demonstrates:  Essentially normal vibratory characteristics during periods of good entrainment  Frequent a periodicity at low modal phonation such as speech  Grossly symmetric    The laryngeal exam was reviewed with Ms. Palmer, and I provided pertinent explanations, as well as written and oral information.    ASSESSMENT / PLAN  IMPRESSIONS: Jaleesa Palmer is presenting today with 2 years of voice changes without clear inciting incident.  Today's evaluation demonstrates dysphonia (R49.0) in the context of significant imbalance in the intrinsic and extrinsic muscles of the larynx.   Today's tasks do point towards a possible contribution from spasm consistent with " an adductor laryngeal dystonia, however the degree of muscular overlay and some inconsistency in presentation makes this difficult to definitively establish.  Laryngeal evaluation shows essentially healthy laryngeal and vocal fold mucosa as well as intact vibratory characteristics on stroboscopy but significant patterns of supraglottic recruitment that are more pronounced in voice versus voiceless stimuli.  Perceptually these findings manifest as significant strain and roughness (Sears) that was amenable to elevated pitch and higher flow.      DETAILED RECOMMENDATIONS:   A course of speech therapy is recommended to optimize vocal technique, improve voice quality, and promote reduced discomfort, effort and fatigue.  She demonstrates a Good prognosis for improvement given adherence to therapeutic recommendations.   Positive indicators: positive response to therapy probes diagnosis is known to respond to treatment  Negative indicators: none  DURATION / FREQUENCY: 6 biweekly and 2 monthly one-hour sessions  Research: This patient is not a candidate for MTD study given possible component of ADLD     GOALS:  Patient goal:   To improve and maintain a healthy voice quality  To understand the problem and fix it as much as possible    Short-term goal(s): Within the first 4 sessions, Ms. Palmer:  will utilize silent inhalation with good low-respiratory engagement 75% of the time during therapy tasks with minimal clinician support  will demonstrate semi-occluded vocal tract (SOVT) exercises with at least 80% accuracy with no clinician support  will accurately identify target vs. habitual voice quality during therapy tasks in 4 out of 5 trials with no clinician support  will demonstrate the ability to alternate between target and habitual voice quality given clinician cue 75% of the time during therapy tasks    Long-term goal(s): In 6 months, Ms. Palmer will:  Report a week of typical activities, in which dysphonia does not  exceed a level of 1 out of 10, 80% of the time    This treatment plan was developed with the patient who agreed with the recommendations.      TOTAL SERVICE TIME: 60 minutes  EVALUATION OF VOICE AND RESONANCE (11730)  NO CHARGE FACILITY FEE (92740)    Sen Small M.M., M.A., CCC-SLP  Speech-Language Pathologist  Certificate of Vocology  [phone]    *this report was created in part through the use of computerized dictation software, and though reviewed following completion, some typographic errors may persist.  If there is confusion regarding any of this notes contents, please contact me for clarification.*

## 2024-10-18 ENCOUNTER — VIRTUAL VISIT (OUTPATIENT)
Dept: OTOLARYNGOLOGY | Facility: CLINIC | Age: 24
End: 2024-10-18
Payer: COMMERCIAL

## 2024-10-18 DIAGNOSIS — R49.0 DYSPHONIA: Primary | ICD-10-CM

## 2024-10-18 PROCEDURE — 92507 TX SP LANG VOICE COMM INDIV: CPT | Mod: GN | Performed by: SPEECH-LANGUAGE PATHOLOGIST

## 2024-10-18 NOTE — LETTER
10/18/2024       RE: Jaleesa Palmer  [address] [place] [address]     Dear Colleague,    Thank you for referring your patient, Jaleesa Palmer, to the [facility]. Please see a copy of my visit note below.    Jaleesa Palmer is a 24 year old female who is being evaluated via a billable video visit.      Jaleesa has been notified and verbally consented to the following:   This video visit will be conducted between you and your provider.  Patient has opted to conduct today's video visit vs an in-person appointment.   Video visits are billed at different rates depending on your insurance coverage. Please reach out to your insurance provider with any questions.   If during the course of the call the provider feels the appointment is not appropriate, you will not be charged for this service.  Provider has received verbal consent for billable virtual visit from the patient? Yes  Will anyone else be joining your video visit? No    Call initiated at: 8:30 AM   Type of Visit Platform Used: [employer] Video  Location of provider: Home  Location of patient: [facility] VOICE [facility]  Kang Cota Jr., M.D., F.A.C.S.  Yi Ortiz M.D., M.P.H.  Niurka Argueta M.D.  CORY Salamanca, M.M. (voice), M.A., CCC-SLP  Marcus Lowery, Ph.D., M.M., -SLP  Kassandra Cerda, Ph.D., CCC-SLP  Madi Copeland, Ph.D., CCC-SLP  Katia Troy M.S., CCC-SLP  Arcenio Small M.M., M.A., CCC-SLP  ANAI Richards (voice), M.S., CCC-SLP    [facility]  VOICE/SPEECH/BREATHING THERAPY PROGRESS REPORT    Patient: Jaleesa Palmer  Date of Service: 10/18/2024    Date of Last Service: 8/29/24  Referring physician: Dr. Niurka Argueta  Initial evaluation: 8/29/24  Therapy Session #1  Total number of Visits: 2    I had the pleasure of seeing Ms. Palmer today, for speech therapy to address a diagnosis of:  Dysphonia (R49.0)  "    PROGRESS SINCE LAST SESSION  Ms. Palmer was seen for evaluation on 8/29/24.  At that time, it was determined that she would benefit from a course of speech therapy to address the above diagnosis.  Since then, she states she has not had any change in her symptoms, which is expected, as no therapeutic suggestions were made at the time.    Ms. Palmer also states that:  She hasn't noticed any pattern to her dysphonia; it seems random, although her voice is reliably worse when she has to be louder  Her voice is also worse when she answers phones, or with specific words (e.g. \"one\" \"eight\")     Ms. Palmer presents today with the following:  No overt tension  Voice qualiity:  Inconsistent strain, mild to moderate, with short bursts of increased strain but also short busts of normal flow  She states this typical for her, and her voice can be much worse  Very frequent glottal talavera  Pitch is WNL at around G3, but in a task of saying the months of the year, she starts each month in glottal talavera, then increasing pitch to G3  In a phoneme-loaded task, voiced phonemes are harder, and with worse quality, and there is also several spasms on single voiced-initial numbers  In a singing task, higher pitches (C5 and D5) are clear and normal  Voice is more clear in speech in upper register (\"Lyndsay Mouse\" voice)    THERAPEUTIC ACTIVITIES  Today Ms. Palmer participated in the following therapeutic activities:  Hypericum exercises to add phonation to an optimal flowing airstream.  Started by monitoring airflow on an unvoiced hiss (good breathing technique)    Added voicing on /dg/, which started smooth with easy flow, then became increasingly strained   Worked on feeling improved airflow on phrases loaded with aspirate onsets or voiceless fricatives.  Much less strain, but a few discrete spasm-like phonation breaks  Good learning of a yawn-sigh approach; felt and heard the improvement  she took careful notes to facilitate " practice.    IMPRESSIONS/GOALS/PLAN  Ms. Palmer had a productive session of speech therapy today, to address the following:  Dysphonia (R49.0)     Speech therapy for her is medically necessary to allow  her to meet personal and professional demands and fully engage in activities of daily living.    She will work on her exercises on a daily basis, and work on incorporating the techniques into her daily activities.    Plan: I will see Ms. Palmer in two weeks to work on education, modification, and carryover of therapeutic activities to more complex activities.    TOTAL SERVICE TIME: 61 minutes  TREATMENT (36320)      Kassandra Cerda, Ph.D., [facility]-SLP  Speech-Language Pathologist  Director, [facility]  She/her/hers  [phone]      Again, thank you for allowing me to participate in the care of your patient.      Sincerely,    Kassandra Cerda, SLP

## 2024-10-18 NOTE — PROGRESS NOTES
Jaleesa Palmer is a 24 year old female who is being evaluated via a billable video visit.      Jaleesa has been notified and verbally consented to the following:   This video visit will be conducted between you and your provider.  Patient has opted to conduct today's video visit vs an in-person appointment.   Video visits are billed at different rates depending on your insurance coverage. Please reach out to your insurance provider with any questions.   If during the course of the call the provider feels the appointment is not appropriate, you will not be charged for this service.  Provider has received verbal consent for billable virtual visit from the patient? Yes  Will anyone else be joining your video visit? No    Call initiated at: 8:30 AM   Type of Visit Platform Used: Gem[employer] Video  Location of provider: Home  Location of patient: [facility]  Kang Cota Jr., M.D., F.A.C.S.  Yi Ortiz M.D., M.P.H.  Niurka Argueta M.D.  CHAVO Salamanca.CAROLE., M.M. (voice), M.A., CCC-SLP  Marcus Lowery, Ph.D., M.M., -SLP  Kassandra Cerda, Ph.D., CCC-SLP  Madi Copeland, Ph.D., CCC-SLP  Katia Troy M.S., CCC-SLP  Arcenio Small M.M., M.A., CCC-SLP  ANAI Richards (voice), M.S., [facility]  VOICE/SPEECH/BREATHING THERAPY PROGRESS REPORT    Patient: Jaleesa Palmer  Date of Service: 10/18/2024    Date of Last Service: 8/29/24  Referring physician: Dr. Niurka Argueta  Initial evaluation: 8/29/24  Therapy Session #1  Total number of Visits: 2    I had the pleasure of seeing Ms. Palmer today, for speech therapy to address a diagnosis of:  Dysphonia (R49.0)     PROGRESS SINCE LAST SESSION  Ms. Palmer was seen for evaluation on 8/29/24.  At that time, it was determined that she would benefit from a course of speech therapy to address the above diagnosis.  Since then, she states she has not had any change in her symptoms, which is expected, as no therapeutic suggestions were  "made at the time.    Ms. Palmer also states that:  She hasn't noticed any pattern to her dysphonia; it seems random, although her voice is reliably worse when she has to be louder  Her voice is also worse when she answers phones, or with specific words (e.g. \"one\" \"eight\")     Ms. Palmer presents today with the following:  No overt tension  Voice qualiity:  Inconsistent strain, mild to moderate, with short bursts of increased strain but also short busts of normal flow  She states this typical for her, and her voice can be much worse  Very frequent glottal talavera  Pitch is WNL at around G3, but in a task of saying the months of the year, she starts each month in glottal talavera, then increasing pitch to G3  In a phoneme-loaded task, voiced phonemes are harder, and with worse quality, and there is also several spasms on single voiced-initial numbers  In a singing task, higher pitches (C5 and D5) are clear and normal  Voice is more clear in speech in upper register (\"Lyndsay Mouse\" voice)    THERAPEUTIC ACTIVITIES  Today Ms. Palmer participated in the following therapeutic activities:  [place] exercises to add phonation to an optimal flowing airstream.  Started by monitoring airflow on an unvoiced hiss (good breathing technique)    Added voicing on /dg/, which started smooth with easy flow, then became increasingly strained   Worked on feeling improved airflow on phrases loaded with aspirate onsets or voiceless fricatives.  Much less strain, but a few discrete spasm-like phonation breaks  Good learning of a yawn-sigh approach; felt and heard the improvement  she took careful notes to facilitate practice.    IMPRESSIONS/GOALS/PLAN  Ms. Palmer had a productive session of speech therapy today, to address the following:  Dysphonia (R49.0)     Speech therapy for her is medically necessary to allow  her to meet personal and professional demands and fully engage in activities of daily living.    She will work on her exercises on " a daily basis, and work on incorporating the techniques into her daily activities.    Plan: I will see Ms. Palmer in two weeks to work on education, modification, and carryover of therapeutic activities to more complex activities.    TOTAL SERVICE TIME: 61 minutes  TREATMENT (51066)      Kassandra Cerda, Ph.D., [facility]-SLP  Speech-Language Pathologist  Director, [facility] Voice [facility]  She/her/hers  [phone]

## 2024-11-08 ENCOUNTER — VIRTUAL VISIT (OUTPATIENT)
Dept: OTOLARYNGOLOGY | Facility: CLINIC | Age: 24
End: 2024-11-08
Payer: COMMERCIAL

## 2024-11-08 DIAGNOSIS — R49.0 DYSPHONIA: Primary | ICD-10-CM

## 2024-11-08 NOTE — LETTER
11/8/2024       RE: Jaleesa Palmer  [address] [address]     Dear Colleague,    Thank you for referring your patient, Jaleesa Palmer, to the [facility] VOICE [facility]IC [facility]. Please see a copy of my visit note below.    Jaleesa Palmer is a 24 year old female who is being evaluated via a billable video visit.      Jaleesa has been notified and verbally consented to the following:   This video visit will be conducted between you and your provider.  Patient has opted to conduct today's video visit vs an in-person appointment.   Video visits are billed at different rates depending on your insurance coverage. Please reach out to your insurance provider with any questions.   If during the course of the call the provider feels the appointment is not appropriate, you will not be charged for this service.  Provider has received verbal consent for billable virtual visit from the patient? Yes  Will anyone else be joining your video visit? No    Call initiated at: 8:00 AM   Type of Visit Platform Used: DICOM Grid Video  Location of provider: Home  Location of patient: [facility] VOICE [facility]  Kang Cota Jr., M.D., F.A.C.S.  Yi Ortiz M.D., M.P.H.  Niurka Argueta M.D.  CORY Salamanca, M.M. (voice), M.A., CCC-SLP  Marcus Lowery, Ph.D., M.M., -SLP  Kassandra Cerda, Ph.D., CCC-SLP  Madi Copeland, Ph.D., CCC-SLP  Katia Troy M.S., CCC-SLP  Arcenio Small M.M., M.A., CCC-SLP  ANAI Richards (voice), M.S., CCC-SLP    [facility]  VOICE/SPEECH/BREATHING THERAPY PROGRESS REPORT    Patient: Jaleesa Palmer  Date of Service: 11/8/2024    Date of Last Service: 10/18/24  Referring physician: Dr. Niurka Argueta  Initial evaluation: 8/29/24  Therapy Session #2  Total number of Visits: 3    I had the pleasure of seeing Ms. Palmer today, for speech therapy to address a diagnosis of:  Dysphonia (R49.0)  "    PROGRESS SINCE LAST SESSION  At the last session, Ms. Palmer worked on therapeutic activities to address the above diagnosis.    Regarding practice, Ms. Palmer reports the following:   Minimal practice; only once or twice in the past two weeks  She didn't print the sheets; she realizes she needs to do this  She didn't notice any improvement  She tried to pause and take a breath while talking; this is what she has done before    Ms. Palmer also states that:  She is essentially the same as last time, but realizes the need to practice in order to make changes     Ms. Palmer presents today with the following:  Some nasal congestion that does not affect her voice  Voice quality:  Inconsistent mild to marked strain, that can be variable even within a phrase  Very frequent glottal talavera that can be difficult to differentiate from the strain  Some shakiness, especially on sustained phonation  A brief \"um\" at Ab3 is shaky  Occasional discrete phonation breaks that are consistent with adductor spasms, but they are not frequent  Laughter is normal, and voice is better right after laughing    THERAPEUTIC ACTIVITIES  Today Ms. Palmer participated in the following therapeutic activities:  Practiced exercises for improved airflow during phonation.  speech material with aspirate or voiceless onsets were facilitating, but they were not the most important factors  She learned concepts of airflow and the improvement from higher pitch  Strained voice quality tended to vary with pitch, whether the lower pitch was at the beginning or end of the utterance (question vs statement)  [place] exercises to experience a more forward sensation during phonation.  speech material with nasal continuants was somewhat facilitating, especially when combined with improved airflow, but again the higher itch made more difference  [place] techniques to use an optimal pitch range in speech.  today s pitch range: A3 to C#4 or Bb3 to D4  able to " "maintain in a variety of tasks for carryover, with moderate cueing and high concentration  Did Q&A tasks with carry phrases \"my favorite\" and \"hhmm\" or \"hah\"  Practiced reciting \"lists\" such as her grocery list  Practiced her phone greeting' this was helpful  Practiced role-playing her job  Good learning and understanding, but will need a great deal of practice  [place] concepts of an optimal regimen for practice.  she should use an interval schedule of practice, with brief periods of practice frequently throughout each day  We agreed she will use a reminder vineet or some other strategy to help her remember to practice.  she took careful notes to facilitate practice.  An audio recording of today's therapeutic activities was made, to facilitate practice.    IMPRESSIONS/GOALS/PLAN  Ms. Palmer had a productive session of speech therapy today, to address the following:  Dysphonia (R49.0)     Speech therapy for her is medically necessary to allow  her to meet personal and professional demands and fully engage in activities of daily living.    She will continue to work on her exercises on a daily basis, and work on incorporating the techniques into her daily activities.    Plan: I will see Ms. Palmer in two weeks to work on education, modification, and carryover of therapeutic activities to more complex activities.    TOTAL SERVICE TIME: 60 minutes  TREATMENT (71605)      Kassandra Cerda, Ph.D., [facility]-SLP  Speech-Language Pathologist  Director, [facility]  She/her/hers  [phone]      Again, thank you for allowing me to participate in the care of your patient.      Sincerely,    Kassandra Cerda, SLP    "

## 2024-11-08 NOTE — PROGRESS NOTES
Jaleesa Palmer is a 24 year old female who is being evaluated via a billable video visit.      Jaleesa has been notified and verbally consented to the following:   This video visit will be conducted between you and your provider.  Patient has opted to conduct today's video visit vs an in-person appointment.   Video visits are billed at different rates depending on your insurance coverage. Please reach out to your insurance provider with any questions.   If during the course of the call the provider feels the appointment is not appropriate, you will not be charged for this service.  Provider has received verbal consent for billable virtual visit from the patient? Yes  Will anyone else be joining your video visit? No    Call initiated at: 8:00 AM   Type of Visit Platform Used: [employer] Video  Location of provider: Home  Location of patient: [facility]  Kang Cota Jr., M.D., F.A.C.S.  Yi Ortiz M.D., M.P.H.  Niurka Argueta M.D.  CHAVO Salamanca.CAROLE., M.M. (voice), M.A., CCC-SLP  Marcus Lowery, Ph.D., M.M., -SLP  Kassanrda Cerda, Ph.D., CCC-SLP  Madi Copeland, Ph.D., CCC-SLP  Katia Troy M.S., CCC-SLP  Arcenio Small M.M., M.A., CCC-SLP  ANAI Richards (voice), M.S., [facility]  VOICE/SPEECH/BREATHING THERAPY PROGRESS REPORT    Patient: Jaleesa Palmer  Date of Service: 11/8/2024    Date of Last Service: 10/18/24  Referring physician: Dr. Niurka Argueta  Initial evaluation: 8/29/24  Therapy Session #2  Total number of Visits: 3    I had the pleasure of seeing Ms. Palmer today, for speech therapy to address a diagnosis of:  Dysphonia (R49.0)     PROGRESS SINCE LAST SESSION  At the last session, Ms. Palmer worked on therapeutic activities to address the above diagnosis.    Regarding practice, Ms. Palmer reports the following:   Minimal practice; only once or twice in the past two weeks  She didn't print the sheets; she realizes she needs to do this  She  "didn't notice any improvement  She tried to pause and take a breath while talking; this is what she has done before    Ms. Palmer also states that:  She is essentially the same as last time, but realizes the need to practice in order to make changes     Ms. Palmer presents today with the following:  Some nasal congestion that does not affect her voice  Voice quality:  Inconsistent mild to marked strain, that can be variable even within a phrase  Very frequent glottal taalvera that can be difficult to differentiate from the strain  Some shakiness, especially on sustained phonation  A brief \"um\" at Ab3 is shaky  Occasional discrete phonation breaks that are consistent with adductor spasms, but they are not frequent  Laughter is normal, and voice is better right after laughing    THERAPEUTIC ACTIVITIES  Today Ms. Palmer participated in the following therapeutic activities:  Practiced exercises for improved airflow during phonation.  speech material with aspirate or voiceless onsets were facilitating, but they were not the most important factors  She learned concepts of airflow and the improvement from higher pitch  Strained voice quality tended to vary with pitch, whether the lower pitch was at the beginning or end of the utterance (question vs statement)  [place] exercises to experience a more forward sensation during phonation.  speech material with nasal continuants was somewhat facilitating, especially when combined with improved airflow, but again the higher itch made more difference  [place] techniques to use an optimal pitch range in speech.  today s pitch range: A3 to C#4 or Bb3 to D4  able to maintain in a variety of tasks for carryover, with moderate cueing and high concentration  Did Q&A tasks with carry phrases \"my favorite\" and \"hhmm\" or \"hah\"  Practiced reciting \"lists\" such as her grocery list  Practiced her phone greeting' this was helpful  Practiced role-playing her job  Good learning and understanding, " but will need a great deal of practice  [place] concepts of an optimal regimen for practice.  she should use an interval schedule of practice, with brief periods of practice frequently throughout each day  We agreed she will use a reminder vineet or some other strategy to help her remember to practice.  she took careful notes to facilitate practice.  An audio recording of today's therapeutic activities was made, to facilitate practice.    IMPRESSIONS/GOALS/PLAN  Ms. Palmer had a productive session of speech therapy today, to address the following:  Dysphonia (R49.0)     Speech therapy for her is medically necessary to allow  her to meet personal and professional demands and fully engage in activities of daily living.    She will continue to work on her exercises on a daily basis, and work on incorporating the techniques into her daily activities.    Plan: I will see Ms. Palmer in two weeks to work on education, modification, and carryover of therapeutic activities to more complex activities.    TOTAL SERVICE TIME: 60 minutes  TREATMENT ([id number])      Kassandra Cerda, Ph.D., [facility]-SLP  Speech-Language Pathologist  Director, [facility] Voice [facility]  She/her/hers  [phone]

## 2024-11-22 ENCOUNTER — VIRTUAL VISIT (OUTPATIENT)
Dept: OTOLARYNGOLOGY | Facility: CLINIC | Age: 24
End: 2024-11-22
Payer: COMMERCIAL

## 2024-11-22 DIAGNOSIS — R49.0 DYSPHONIA: Primary | ICD-10-CM

## 2024-11-22 NOTE — PROGRESS NOTES
Jaleesa Palmer is a 24 year old female who is being evaluated via a billable video visit.      Jaleesa has been notified and verbally consented to the following:   This video visit will be conducted between you and your provider.  Patient has opted to conduct today's video visit vs an in-person appointment.   Video visits are billed at different rates depending on your insurance coverage. Please reach out to your insurance provider with any questions.   If during the course of the call the provider feels the appointment is not appropriate, you will not be charged for this service.  Provider has received verbal consent for billable virtual visit from the patient? Yes  Will anyone else be joining your video visit? No    Call initiated at: 8:00 AM   Type of Visit Platform Used: [employer] Video  Location of provider: Home  Location of patient: [facility]  Kang Cota Jr., M.D., F.A.C.S.  Yi Ortiz M.D., M.P.H.  Niurka Argueta M.D.  María Plascencia SLP.CAROLE., M.M. (voice), M.A., CCC-SLP  Marcus Lowery, Ph.D., M.M., -SLP  Kassandra Cerda, Ph.D., CCC-SLP  Madi Copeland, Ph.D., CCC-SLP  Katia Troy M.S., CCC-SLP  Arcenio Small M.M., M.A., CCC-SLP  ANAI Richards (voice), M.S., [facility]  VOICE/SPEECH/BREATHING THERAPY PROGRESS REPORT    Patient: Jaleesa Palmer  Date of Service: 11/22/2024    Date of Last Service: 11/8/24  Referring physician: Dr. Niurka Argueta  Initial evaluation: 8/29/24  Therapy Session #3  Total number of Visits: 4    I had the pleasure of seeing Ms. Palmre today, for speech therapy to address a diagnosis of:  Dysphonia (R49.0)     PROGRESS SINCE LAST SESSION  At the last session, Ms. Palmer worked on therapeutic activities to address the above diagnosis.    Regarding practice, Ms. Palmer reports the following:   Daily practice of all the exercises  She made up her own h-phrases  Practiced lists, and her answers to phone conversations  The  "strategy of using a more aspirated \"m\" and talking a bit higher were both helpful  voice is improved during the exercises, but carryover to spontaneous conversation doesn't last long and is inconsistent and variable    Ms. Palmer also states that:  Loud talking is still the biggest challenge  Yesterday was a bad day; \"anything I tried wasn't really working\"  \"It does feel like there are some days I can't do anything to make it better\"  \"It's hard to think I could completely eradicate this problem myself\"  She thinks she is about 30% better overall  She occasionally hears discrete spasms, as well as the sustained strained quality     Ms. Palmer presents today with the following:  Voice quality:  Inconsistent mild to moderate strain, with frequent occasions of very extended talavera-like strained phonation  Upward inflections are clear at the higher pitches  Phonation at Bb3 and above is generally better, but there are frequent times in which the pitch is lowered, in a manner suggesting an unintended contraction of the thyrohyoid    THERAPEUTIC ACTIVITIES  Today Ms. Palmer participated in the following therapeutic activities:  In response to her questions, I provided explanations the potential benefit of Botox injections.   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 Palmer had a productive session " of speech therapy today, to address the following:  Dysphonia (R49.0)     Speech therapy for her is medically necessary to allow  her to meet personal and professional demands and fully engage in activities of daily living.    She will continue to work on her exercises on a daily basis, and work on incorporating the techniques into her daily activities.    Plan: I will see Ms. Palmer in two weeks to work on education, modification, and carryover of therapeutic activities to more complex activities.    TOTAL SERVICE TIME: 56 minutes  TREATMENT ([id number])      Kassandra Cerda, Ph.D., [facility]-SLP  Speech-Language Pathologist  Director, [facility]  She/her/hers  [phone]

## 2024-11-22 NOTE — LETTER
11/22/2024       RE: Jaleesa Palmer  [address] [facility] [address] [place] [address]     Dear Colleague,    Thank you for referring your patient, Jaleesa Palmer, to the [facility] VOICE CLINIC [facility]. Please see a copy of my visit note below.    Jaleesa Palmer is a 24 year old female who is being evaluated via a billable video visit.      Jaleesa has been notified and verbally consented to the following:   This video visit will be conducted between you and your provider.  Patient has opted to conduct today's video visit vs an in-person appointment.   Video visits are billed at different rates depending on your insurance coverage. Please reach out to your insurance provider with any questions.   If during the course of the call the provider feels the appointment is not appropriate, you will not be charged for this service.  Provider has received verbal consent for billable virtual visit from the patient? Yes  Will anyone else be joining your video visit? No    Call initiated at: 8:00 AM   Type of Visit Platform Used: [employer] Video  Location of provider: Home  Location of patient: [facility] VOICE [facility]  Kang Cota Jr., M.D., F.A.C.S.  Yi Ortiz M.D., M.P.H.  Niurka Argueta M.D.  CORY Salamanca, M.M. (voice), M.A., CCC-SLP  Marcus Lowery, Ph.D., M.M., -SLP  Kassandra Cerda, Ph.D., CCC-SLP  Madi Copeland, Ph.D., CCC-SLP  Katia Troy M.S., CCC-SLP  Arcenio Small M.M., M.A., CCC-SLP  ANAI Richards (voice), M.S., CCC-SLP    [facility]  VOICE/SPEECH/BREATHING THERAPY PROGRESS REPORT    Patient: Jaleesa Palmer  Date of Service: 11/22/2024    Date of Last Service: 11/8/24  Referring physician: Dr. Niurka Argueta  Initial evaluation: 8/29/24  Therapy Session #3  Total number of Visits: 4    I had the pleasure of seeing Ms. Palmer today, for speech therapy to address a diagnosis of:  Dysphonia (R49.0)  "    PROGRESS SINCE LAST SESSION  At the last session, Ms. Palmer worked on therapeutic activities to address the above diagnosis.    Regarding practice, Ms. Palmer reports the following:   Daily practice of all the exercises  She made up her own h-phrases  Practiced lists, and her answers to phone conversations  The strategy of using a more aspirated \"m\" and talking a bit higher were both helpful  voice is improved during the exercises, but carryover to spontaneous conversation doesn't last long and is inconsistent and variable    Ms. Palmer also states that:  Loud talking is still the biggest challenge  Yesterday was a bad day; \"anything I tried wasn't really working\"  \"It does feel like there are some days I can't do anything to make it better\"  \"It's hard to think I could completely eradicate this problem myself\"  She thinks she is about 30% better overall  She occasionally hears discrete spasms, as well as the sustained strained quality     Ms. Palmer presents today with the following:  Voice quality:  Inconsistent mild to moderate strain, with frequent occasions of very extended talavera-like strained phonation  Upward inflections are clear at the higher pitches  Phonation at Bb3 and above is generally better, but there are frequent times in which the pitch is lowered, in a manner suggesting an unintended contraction of the thyrohyoid    THERAPEUTIC ACTIVITIES  Today Ms. Palmer participated in the following therapeutic activities:  In response to her questions, I provided explanations the potential benefit of Botox injections.   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 Palmer had a productive session of speech therapy today, to address the following:  Dysphonia (R49.0)     Speech therapy for her is medically necessary to allow  her to meet personal and professional demands and fully engage in activities of daily living.    She will continue to work on her exercises on a daily basis, and work on incorporating the techniques into her daily activities.    Plan: I will see Ms. Palmer in two weeks to work on education, modification, and carryover of therapeutic activities to more complex activities.    TOTAL SERVICE TIME: 56 minutes  TREATMENT ([id number])      Kassandra Cerda, Ph.D., [facility]-SLP  Speech-Language Pathologist  Director, [facility] Voice [facility]  She/her/hers  [phone]      Again, thank you for allowing me to participate in the care of your patient.      Sincerely,    Kassandra Cerda, SLP

## 2024-11-26 ENCOUNTER — PREP FOR PROCEDURE (OUTPATIENT)
Dept: OTOLARYNGOLOGY | Facility: CLINIC | Age: 24
End: 2024-11-26
Payer: COMMERCIAL

## 2024-11-26 DIAGNOSIS — J38.5 LARYNGEAL SPASM: Primary | ICD-10-CM

## 2024-11-26 RX ORDER — OXYMETAZOLINE HYDROCHLORIDE 0.05 G/100ML
2 SPRAY NASAL ONCE
OUTPATIENT
Start: 2024-11-26 | End: 2024-11-26

## 2024-11-27 ENCOUNTER — OFFICE VISIT (OUTPATIENT)
Dept: OBGYN | Facility: CLINIC | Age: 24
End: 2024-11-27
Payer: COMMERCIAL

## 2024-11-27 VITALS
BODY MASS INDEX: 26.07 KG/M2 | WEIGHT: 176 LBS | SYSTOLIC BLOOD PRESSURE: 128 MMHG | DIASTOLIC BLOOD PRESSURE: 78 MMHG | HEIGHT: 69 IN

## 2024-11-27 DIAGNOSIS — N89.8 VAGINAL DISCHARGE: ICD-10-CM

## 2024-11-27 DIAGNOSIS — N75.1 BARTHOLIN'S GLAND ABSCESS: Primary | ICD-10-CM

## 2024-11-27 LAB
CLUE CELLS: NORMAL
TRICHOMONAS, WET PREP: NORMAL
WBC'S/HIGH POWER FIELD, WET PREP: NORMAL
YEAST, WET PREP: NORMAL

## 2024-11-27 PROCEDURE — 87210 SMEAR WET MOUNT SALINE/INK: CPT | Performed by: FAMILY MEDICINE

## 2024-11-27 PROCEDURE — 87070 CULTURE OTHR SPECIMN AEROBIC: CPT | Performed by: FAMILY MEDICINE

## 2024-11-27 PROCEDURE — 87205 SMEAR GRAM STAIN: CPT | Performed by: FAMILY MEDICINE

## 2024-11-27 RX ORDER — LIDOCAINE HYDROCHLORIDE 20 MG/ML
3 INJECTION, SOLUTION INFILTRATION; PERINEURAL ONCE
Status: COMPLETED | OUTPATIENT
Start: 2024-11-27 | End: 2024-11-27

## 2024-11-27 RX ADMIN — LIDOCAINE HYDROCHLORIDE 3 ML: 20 INJECTION, SOLUTION INFILTRATION; PERINEURAL at 15:46

## 2024-11-27 NOTE — PATIENT INSTRUCTIONS
Return in a week     Dr. Tova Ramey, DO    Obstetrics and Gynecology  [facility] [place] [facility] [place]

## 2024-11-27 NOTE — PROGRESS NOTES
SUBJECTIVE:  Jaleesa Palmer is an 24 year old  woman who presents for   gynecology consult for recurrent bartholin gland cyst, now on her 5th bartholin gland cyst.      Patient's last menstrual period was 11/06/2024 (approximate). Periods are regular q 28-30 days, lasting   5 days. Menarche @ age teen, Dysmenorrhea:mild, occurring premenstrually and first 1-2 days of flow. Cyclic symptoms   include none. No intermenstrual bleeding,   spotting, or discharge.    Current contraception: oral contraceptives  History of abnormal Pap smear: No  Family history of uterine or ovarian cancer: No  History of abnormal mammogram: No  Family history of breast cancer: No        No past medical history on file.       Family History   Problem Relation Age of Onset    Other Cancer Maternal Grandmother     Breast Cancer Other        Past Surgical History:   Procedure Laterality Date    MARSUPIALIZATION BARTHOLIN CYST Left 11/1/2023    Procedure: MARSUPIALIZATION, CYST, BARTHOLIN'S GLAND;  Surgeon: Tova Ramey DO;  Location: [facility] OR    wisdom teeth         Current Outpatient Medications   Medication Sig Dispense Refill    levonorgestrel-ethinyl estradiol (FALMINA) 0.1-20 MG-MCG tablet Take 1 tablet by mouth daily 84 tablet 4     No current facility-administered medications for this visit.     No Known Allergies    Social History     Tobacco Use    Smoking status: Never     Passive exposure: Never    Smokeless tobacco: Never   Substance Use Topics    Alcohol use: Yes     Comment: occas       Review Of Systems  Ears/Nose/Throat: negative  Respiratory: No shortness of breath, dyspnea on exertion, cough, or hemoptysis  Cardiovascular: negative  Gastrointestinal: negative  Genitourinary: See HPI   Constitutional, HEENT, cardiovascular, pulmonary, GI, , musculoskeletal, neuro, skin, endocrine and psych systems are negative, except as otherwise noted.    OBJECTIVE:  /78 (BP Location: Left arm, Patient Position: Chair, Cuff  "Size: Adult Regular)   Ht 1.753 m (5' 9\")   Wt 79.8 kg (176 lb)   LMP 11/06/2024 (Approximate)   Breastfeeding No   BMI 25.99 kg/m    General appearance: healthy, alert, and no distress  Skin: Skin color, texture, turgor normal. No rashes or lesions.  Ears: negative  Nose/Sinuses: Nares normal. Septum midline. Mucosa normal. No drainage or sinus tenderness.  Oropharynx: Lips, mucosa, and tongue normal. Teeth and gums normal.  Neck: Neck supple. No adenopathy. Thyroid symmetric, normal size,, Carotids without bruits.  Lungs: negative, Percussion normal. Good diaphragmatic excursion. Lungs clear  Heart: negative, PMI normal. No lifts, heaves, or thrills. RRR. No murmurs, clicks gallops or rub  Pelvic: Pelvic:  Pelvic examination with no pap/no Gonorrhea and Chlamydia   including  External genitalia 1.5 cm left bartholin cyst     Procedure:  Informed consent is signed.    Bartholin's gland is located on left lower labia, cleansed with betadine, 6 cc lidocaine (1%) infiltrated,   Betadine prepped again.  11 blade scalpel used to incise the area in a cruciate pattern and copious infected fluid expelled.  Hemostasis was   Then assured with pressure. Word catheter placed.   The patient tolerated the procedure well.  Instructed to shower once in next day and then do daily 15 minute baths.  Return to clinic in one week or prn           ASSESSMENT:  Jaleesa Palmer is an 24 year old  woman who presents for   gynecology consult for recurrent bartholin gland cyst, now on her 5th bartholin gland cyst.      PLAN:  Dx:  1)  Recurrent batholin gland cyst:  s/p incision and drainage, word catheter placed       Dr. Tova Ramey, DO    Obstetrics and Gynecology  [facility]       "

## 2024-11-27 NOTE — NURSING NOTE
"Chief Complaint   Patient presents with    Vaginal Problem     ?bartholins cyst       Initial /78 (BP Location: Left arm, Patient Position: Chair, Cuff Size: Adult Regular)   Ht 1.753 m (5' 9\")   Wt 79.8 kg (176 lb)   LMP 11/06/2024 (Approximate)   Breastfeeding No   BMI 25.99 kg/m   Estimated body mass index is 25.99 kg/m  as calculated from the following:    Height as of this encounter: 1.753 m (5' 9\").    Weight as of this encounter: 79.8 kg (176 lb).  BP completed using cuff size: regular    Questioned patient about current smoking habits.  Pt. has never smoked.      No obstetric history on file.    The following HM Due: NONE  Michelle Gasca CMA        "

## 2024-11-28 LAB
BACTERIA ABSC ANAEROBE+AEROBE CULT: ABNORMAL
BACTERIA ABSC ANAEROBE+AEROBE CULT: ABNORMAL
GRAM STAIN RESULT: ABNORMAL
GRAM STAIN RESULT: ABNORMAL

## 2024-11-30 LAB
BACTERIA ABSC ANAEROBE+AEROBE CULT: ABNORMAL
GRAM STAIN RESULT: ABNORMAL
GRAM STAIN RESULT: ABNORMAL

## 2024-12-02 ENCOUNTER — TELEPHONE (OUTPATIENT)
Dept: OTOLARYNGOLOGY | Facility: CLINIC | Age: 24
End: 2024-12-02
Payer: COMMERCIAL

## 2024-12-02 ENCOUNTER — MYC MEDICAL ADVICE (OUTPATIENT)
Dept: FAMILY MEDICINE | Facility: CLINIC | Age: 24
End: 2024-12-02
Payer: COMMERCIAL

## 2024-12-02 NOTE — TELEPHONE ENCOUNTER
Left patient a voicemail to schedule surgery for LARYNGOSCOPY, FLEXIBLE WITH INJECTION with botox with Dr. Argueta - Left Surgery Scheduling line for callback [phone]    Roxana Chaney on 12/2/2024 at 1:48 PM

## 2024-12-03 ENCOUNTER — VIRTUAL VISIT (OUTPATIENT)
Dept: FAMILY MEDICINE | Facility: CLINIC | Age: 24
End: 2024-12-03
Payer: COMMERCIAL

## 2024-12-03 DIAGNOSIS — Z30.41 ENCOUNTER FOR SURVEILLANCE OF CONTRACEPTIVE PILLS: ICD-10-CM

## 2024-12-03 DIAGNOSIS — N92.1 BREAKTHROUGH BLEEDING ON BIRTH CONTROL PILLS: Primary | ICD-10-CM

## 2024-12-03 DIAGNOSIS — L70.9 ACNE, UNSPECIFIED ACNE TYPE: ICD-10-CM

## 2024-12-03 PROCEDURE — 99213 OFFICE O/P EST LOW 20 MIN: CPT | Mod: 95 | Performed by: NURSE PRACTITIONER

## 2024-12-03 RX ORDER — NORGESTIMATE AND ETHINYL ESTRADIOL 0.25-0.035
1 KIT ORAL DAILY
Qty: 112 TABLET | Refills: 3 | Status: SHIPPED | OUTPATIENT
Start: 2024-12-03

## 2024-12-03 NOTE — PROGRESS NOTES
"Jaleesa is a 24 year old who is being evaluated via a billable video visit.    How would you like to obtain your AVS? MyChart  If the video visit is dropped, the invitation should be resent by: Text to cell phone: [phone]  Will anyone else be joining your video visit? No  {If patient encounters technical issues they should call [phone] :167925}    {PROVIDER CHARTING PREFERENCE:430721}    Subjective   Jaleesa is a 24 year old, presenting for the following health issues:  Recheck Medication (Birth Control)      12/3/2024     2:50 PM   Additional Questions   Roomed by Darrick HALE   Accompanied by Self     History of Present Illness       Reason for visit:  Talking aboit birth control options   She is taking medications regularly.     Medication Followup of Falmina   Taking Medication as prescribed: yes  Side Effects:  None  Medication Helping Symptoms:  Wants a pill for that can take continuously - no menses.   Also requested one for ance control as well.       Ance    Wt Readings from Last 5 Encounters:   11/27/24 79.8 kg (176 lb)   08/29/24 81.2 kg (179 lb)   08/06/24 78.5 kg (173 lb)   11/01/23 75.8 kg (167 lb)   10/30/23 77.6 kg (171 lb)            {ROS Picklists (Optional):860631}      Objective           Vitals:  No vitals were obtained today due to virtual visit.    Physical Exam   {video visit exam brief selected:859551}    {Diagnostic Test Results (Optional):800348}      Video-Visit Details    Type of service:  Video Visit   Originating Location (pt. Location): {video visit patient location:494439::\"Home\"}  {PROVIDER LOCATION On-site should be selected for visits conducted from your clinic location or adjoining [facility] hospital, academic office, or other nearby [facility] building. Off-site should be selected for all other provider locations, including home:106744}  Distant Location (provider location):  {virtual location provider:015029}  Platform used for Video Visit: {Virtual Visit " "Platforms:786004::\"Olga\"}  Signed Electronically by: LIGIA Watts CNP  {Email feedback regarding this note to [email or web address]   :555171}  "

## 2024-12-03 NOTE — TELEPHONE ENCOUNTER
Would recommend a virtual visit to discuss medication changes.   There should be a discussion including if it is the right medication that can be taken continuous how often to take a break to have a menstrual cycle and the prescription does need to be changed to give her enough.  Please have her set up a virtual visit      Healthy regards,            Gabriela Bright, FNP-BC

## 2024-12-05 ENCOUNTER — VIRTUAL VISIT (OUTPATIENT)
Dept: OTOLARYNGOLOGY | Facility: CLINIC | Age: 24
End: 2024-12-05
Payer: COMMERCIAL

## 2024-12-05 DIAGNOSIS — R49.0 DYSPHONIA: Primary | ICD-10-CM

## 2024-12-05 NOTE — PROGRESS NOTES
Jaleesa Palmer is a 24 year old female who is being evaluated via a billable video visit.      Jaleesa has been notified and verbally consented to the following:   This video visit will be conducted between you and your provider.  Patient has opted to conduct today's video visit vs an in-person appointment.   Video visits are billed at different rates depending on your insurance coverage. Please reach out to your insurance provider with any questions.   If during the course of the call the provider feels the appointment is not appropriate, you will not be charged for this service.  Provider has received verbal consent for billable virtual visit from the patient? Yes  Will anyone else be joining your video visit? No    Call initiated at: 8:30 AM   Type of Visit Platform Used: [employer] Video  Location of provider: Home  Location of patient: [facility]  Kang Cota Jr., M.D., F.A.C.S.  Yi Ortiz M.D., M.P.H.  Niurka Argueta M.D.  CHAVO Salamanca.CAROLE., M.M. (voice), M.A., CCC-SLP  Marcus Lowery, Ph.D., M.M., -SLP  Kassandra Cerda, Ph.D., CCC-SLP  Madi Copeland, Ph.D., CCC-SLP  Katia Troy M.S., CCC-SLP  Arcenio Small M.M., M.A., CCC-SLP  ANAI Richards (voice), M.S., [facility]  VOICE/SPEECH/BREATHING THERAPY PROGRESS REPORT    Patient: Jaleesa Palmer  Date of Service: 12/5/2024    Date of Last Service: 11/22/24  Referring physician: Dr. Niurka Argueta  Initial evaluation: 8/29/24  Therapy Session #4  Total number of Visits: 5    I had the pleasure of seeing Ms. Palmer today, for speech therapy to address a diagnosis of:  Dysphonia (R49.0)     PROGRESS SINCE LAST SESSION  At the last session, Ms. Palmer worked on therapeutic activities to address the above diagnosis.  We determined that she is likely to benefit from a trial of Botox injections.    Regarding practice, Ms. Palmer reports the following:   Irregular infrequent practice of the exercises  She  "does try to use the techniques of higher pitch and improved airflow while she is at work, especially before a phone call    Ms. Palmer also states that:  Using a higher pitch helps, but her attention to that technique is inconsistent, and she hasn't practiced it much outside of her normal voice use  It is also helpful if she stops to take a breath and use more air as we have practice; it sets her up to use her voice better  She has looked into the possibility of Botox injections, but the cost may be prohibitive  She has a mild URI today, and thinks it does affect her ability to use techniques     Ms. Palmer presents today with the following:  Voice quality:  The same inconsistent tense-tight, shaking, talavera quality, contrasted with normal quality in laughter, and when her pitch is higher  Overall inconsistent quality, but worse than normal limits  She says \"um\" at G4, and voice is better for the rest of the utterance  Pitch can't be discerned on the low, talavera-like vocalizations  The effects of the URI are not apparent  Cough/ Throat clear:  none    THERAPEUTIC ACTIVITIES  Today Ms. Palmer participated in the following therapeutic activities:  I provided some suggestions on how to troubleshoot the problem of insurance coverage for Botox  [place] concepts of the potential for infrequent Botox injections to help her learn to use techniques more effectively  Practiced her voice techniques, starting with a listing task (her grocery list, with upward inflections) and progressing to conversation.  In upward infections the higher note is generally quite good, though sometimes mildly tense tight; the bottom pitch is very tight and noisy  With redirection, she was able to raise the pitch on the word \"and,\" which she typically did in talavera; this led to an overall improved airflow and quality  She felt the difference but didn't hear it; when we recorded the task and she listened back, she still thought it sounded unnatural, " though when I mimicked her inflections, she thought they sounded natural  I explained this phenomenon  Developed and practiced a regimen of exercises that she should use 3 times per day for the next month, to see what benefit she can achieve from regular, concerted exercise  Start with the listing task, with both upward and downward inflections  Practice her phone greeting, and some simple conversations that she typically has with clients  Do a story telling task, paying attention to pitch and airflow, and allowing the sounds and sensation to feel more natural over time  This will help her determine how important it is for her to try Botox at this time  I facilitated a transfer to another SLP; she will pursue this when she knows more about the prospect of Botox  she took careful notes to facilitate practice.    IMPRESSIONS/GOALS/PLAN  Ms. Palmer had a productive session of speech therapy today, to address the following:  Dysphonia (R49.0)   Speech therapy for her is medically necessary to allow  her to meet personal and professional demands and fully engage in activities of daily living.    She will continue to work on her exercises on a daily basis, and work on incorporating the techniques into her daily activities.    Plan: Ms. Palmer will transfer her therapy to another Speech-Language Pathologist on our team, regardless of whether she decides to pursue Botox    TOTAL SERVICE TIME: 55 minutes  TREATMENT ([id number])      Kassandra Cerda, Ph.D., [facility]-SLP  Speech-Language Pathologist  Director, [facility] Voice Clinic  She/her/hers

## 2024-12-05 NOTE — LETTER
12/5/2024       RE: Jaleesa Palmer  [address] [address]     Dear Colleague,    Thank you for referring your patient, Jaleesa Palmer, to the [facility] VOICE CLINIC [facility]. Please see a copy of my visit note below.    Jaleesa Palmer is a 24 year old female who is being evaluated via a billable video visit.      Jaleesa has been notified and verbally consented to the following:   This video visit will be conducted between you and your provider.  Patient has opted to conduct today's video visit vs an in-person appointment.   Video visits are billed at different rates depending on your insurance coverage. Please reach out to your insurance provider with any questions.   If during the course of the call the provider feels the appointment is not appropriate, you will not be charged for this service.  Provider has received verbal consent for billable virtual visit from the patient? Yes  Will anyone else be joining your video visit? No    Call initiated at: 8:30 AM   Type of Visit Platform Used: [employer] Video  Location of provider: Home  Location of patient: [facility] VOICE [facility]  Kang Cota Jr., M.D., F.A.C.S.  Yi Ortiz M.D., M.P.H.  Niurka Argueta M.D.  CORY Salamanca, M.M. (voice), M.A., CCC-SLP  Marcus Lowery, Ph.D., M.M., -SLP  Kassandra Cerda, Ph.D., CCC-SLP  Madi Copeland, Ph.D., CCC-SLP  Katia Troy M.S., CCC-SLP  Arcenio Small M.M., M.A., CCC-SLP  ANAI Richards (voice), M.S., CCC-SLP    [facility]  VOICE/SPEECH/BREATHING THERAPY PROGRESS REPORT    Patient: Jaleesa Palmer  Date of Service: 12/5/2024    Date of Last Service: 11/22/24  Referring physician: Dr. Niurka Argueta  Initial evaluation: 8/29/24  Therapy Session #4  Total number of Visits: 5    I had the pleasure of seeing Ms. Palmer today, for speech therapy to address a diagnosis of:  Dysphonia (R49.0)  "    PROGRESS SINCE LAST SESSION  At the last session, Ms. Palmer worked on therapeutic activities to address the above diagnosis.  We determined that she is likely to benefit from a trial of Botox injections.    Regarding practice, Ms. Palmer reports the following:   Irregular infrequent practice of the exercises  She does try to use the techniques of higher pitch and improved airflow while she is at work, especially before a phone call    Ms. Palmer also states that:  Using a higher pitch helps, but her attention to that technique is inconsistent, and she hasn't practiced it much outside of her normal voice use  It is also helpful if she stops to take a breath and use more air as we have practice; it sets her up to use her voice better  She has looked into the possibility of Botox injections, but the cost may be prohibitive  She has a mild URI today, and thinks it does affect her ability to use techniques     Ms. Palmer presents today with the following:  Voice quality:  The same inconsistent tense-tight, shaking, talavera quality, contrasted with normal quality in laughter, and when her pitch is higher  Overall inconsistent quality, but worse than normal limits  She says \"um\" at G4, and voice is better for the rest of the utterance  Pitch can't be discerned on the low, talavera-like vocalizations  The effects of the URI are not apparent  Cough/ Throat clear:  none    THERAPEUTIC ACTIVITIES  Today Ms. Palmer participated in the following therapeutic activities:  I provided some suggestions on how to troubleshoot the problem of insurance coverage for Botox  [place] concepts of the potential for infrequent Botox injections to help her learn to use techniques more effectively  Practiced her voice techniques, starting with a listing task (her grocery list, with upward inflections) and progressing to conversation.  In upward infections the higher note is generally quite good, though sometimes mildly tense tight; the bottom " "pitch is very tight and noisy  With redirection, she was able to raise the pitch on the word \"and,\" which she typically did in talavera; this led to an overall improved airflow and quality  She felt the difference but didn't hear it; when we recorded the task and she listened back, she still thought it sounded unnatural, though when I mimicked her inflections, she thought they sounded natural  I explained this phenomenon  Developed and practiced a regimen of exercises that she should use 3 times per day for the next month, to see what benefit she can achieve from regular, concerted exercise  Start with the listing task, with both upward and downward inflections  Practice her phone greeting, and some simple conversations that she typically has with clients  Do a story telling task, paying attention to pitch and airflow, and allowing the sounds and sensation to feel more natural over time  This will help her determine how important it is for her to try Botox at this time  I facilitated a transfer to another SLP; she will pursue this when she knows more about the prospect of Botox  she took careful notes to facilitate practice.    IMPRESSIONS/GOALS/PLAN  Ms. Palmer had a productive session of speech therapy today, to address the following:  Dysphonia (R49.0)   Speech therapy for her is medically necessary to allow  her to meet personal and professional demands and fully engage in activities of daily living.    She will continue to work on her exercises on a daily basis, and work on incorporating the techniques into her daily activities.    Plan: Ms. Palmer will transfer her therapy to another Speech-Language Pathologist on our team, regardless of whether she decides to pursue Botox    TOTAL SERVICE TIME: 55 minutes  TREATMENT ([id number])      Kassandra Cerda, Ph.D., [facility]-SLP  Speech-Language Pathologist  Director, [facility] Voice [facility]  She/her/hers      Again, thank you for allowing me to participate in the care of your " patient.      Sincerely,    Kassandra Cerda, SLP

## 2025-01-10 ENCOUNTER — TELEPHONE (OUTPATIENT)
Dept: OBGYN | Facility: CLINIC | Age: 25
End: 2025-01-10

## 2025-01-10 NOTE — TELEPHONE ENCOUNTER
Surgeon: Dr Shae Espinoza   Assist:  No   Location: [facility] or OR--wherever I can get the mini ligasure we use for C sections for tubals   Date/time preference:  per patient but ideally in the next 4 weeks (not sooner than 10 days due to needing to finish antibiotics)     Surgery:  excision of left Bartholin gland   Length of Surgery:  45 min   Diagnosis:  recurrent Bartholin gland abscesses   Anesthesia type:  GENERAL     Special instructions / equipment:  small hand held ligasure we use for C section tubals   Am admit or same day: SAME DAY   Bowel prep: No   Pre op: PCP   Office visit with surgeon prior to surgery: Yes 1 week prior to surgery   Schedule Post Op: 4-6 weeks.     Shae Espinoza MD   [facility] Obstetrics [facility] Gynecology

## 2025-01-15 NOTE — TELEPHONE ENCOUNTER
Type of surgery: EXCISION OF LEFT BARTHOLIN GLAND  Location of surgery: Other: [facility]  Date and time of surgery: 1/29/25 @ 10:45 AM  Surgeon: DR ESTRELLA  Pre-Op Appt Date: PATIENT ADVISED TO SCHEDULE.  Post-Op Appt Date: 3/7/25   Packet sent out: Yes  Pre-cert/Authorization completed:  No  Date: 1/15/25

## 2025-01-28 ENCOUNTER — VIRTUAL VISIT (OUTPATIENT)
Dept: OBGYN | Facility: CLINIC | Age: 25
End: 2025-01-28
Payer: COMMERCIAL

## 2025-01-28 DIAGNOSIS — N75.1 BARTHOLIN'S GLAND ABSCESS: Primary | ICD-10-CM

## 2025-01-28 PROCEDURE — 98005 SYNCH AUDIO-VIDEO EST LOW 20: CPT | Performed by: OBSTETRICS & GYNECOLOGY

## 2025-01-28 NOTE — PROGRESS NOTES
Jaleesa Palmer is a 24 year old female who is being evaluated via a billable video visit.      What phone number would you like to be contacted at? Video  How would you like to obtain your AVS? Jerry  Pt in [place] and provider at [facility] for visit    Jaleesa Palmer is a 24 year old female who presents for virtual visit today for the following health issue(s):  Patient presents with:  Consult: Bartholin gland excision      Carol requested a virtual visit to answer further questions about the surgery.  See my prior visit for complete details.    She is scheduled for Bartholin's gland excision.    We spent approximately 11 minutes going through what to expect preoperatively, intraoperatively, and postoperatively.  This will be performed under general anesthesia.  We reviewed the risk, to all surgeries, and those specific to this surgery which include a potential for scarring, pain, dyspareunia, decrease in lubrication with sexual activity.  We discussed the risk of bleeding both at the time of surgery and is a delayed reaction.  She asked all of her questions and I attempted to answer them as thoroughly as possible.  At the end of our visit she did state that her questions have been answered to her satisfaction.    Additional information: 11 mins time in video.

## 2025-01-29 ENCOUNTER — LAB REQUISITION (OUTPATIENT)
Dept: LAB | Facility: CLINIC | Age: 25
End: 2025-01-29
Payer: COMMERCIAL

## 2025-01-29 DIAGNOSIS — N75.1 ABSCESS OF BARTHOLIN'S GLAND: ICD-10-CM

## 2025-01-29 PROCEDURE — 88304 TISSUE EXAM BY PATHOLOGIST: CPT | Mod: 26 | Performed by: PATHOLOGY

## 2025-01-29 PROCEDURE — 88304 TISSUE EXAM BY PATHOLOGIST: CPT | Mod: TC,ORL | Performed by: OBSTETRICS & GYNECOLOGY

## 2025-01-30 ENCOUNTER — MYC MEDICAL ADVICE (OUTPATIENT)
Dept: OBGYN | Facility: CLINIC | Age: 25
End: 2025-01-30
Payer: COMMERCIAL

## 2025-01-30 LAB
PATH REPORT.COMMENTS IMP SPEC: NORMAL
PATH REPORT.COMMENTS IMP SPEC: NORMAL
PATH REPORT.FINAL DX SPEC: NORMAL
PATH REPORT.GROSS SPEC: NORMAL
PATH REPORT.MICROSCOPIC SPEC OTHER STN: NORMAL
PATH REPORT.RELEVANT HX SPEC: NORMAL
PHOTO IMAGE: NORMAL

## 2025-01-31 NOTE — TELEPHONE ENCOUNTER
Was this pt's document completed and faxed for her time off work?    Judith HERNANDEZ RN BSN  [facility]

## 2025-02-06 NOTE — TELEPHONE ENCOUNTER
Call to pt to discuss symptoms. Left message for call back. Also sent [employer].   Can send to Dr Espinoza after to advise if appt needed.    Ginny CONNOR RN  [facility] [place]

## 2025-02-06 NOTE — TELEPHONE ENCOUNTER
Agree, this sounds like normal healing process. There will be some scar tissue there in addition for a while--we removed tissue and the inflammation is to be expected. Thanks.  Shae Espinoza MD

## 2025-02-06 NOTE — TELEPHONE ENCOUNTER
Spoke to pt (Bartholin gland removal on 1/29/25),    She has a hard lump where incision was. Get a little bigger by end of day.  Not visible but she can feel it, size of a grape.     We discussed fluid and blood filling the area. Was using cool packs, then tried heat packs yesterday. Neither made much difference.    Getting slightly more uncomfortable. But she is moving around more.    Wanting to make sure this all sounds normal.     No fever or other signs of infection.    Judith HERNANDEZ RN BSN  [facility]

## 2025-02-10 ENCOUNTER — MYC REFILL (OUTPATIENT)
Dept: FAMILY MEDICINE | Facility: CLINIC | Age: 25
End: 2025-02-10
Payer: COMMERCIAL

## 2025-02-10 DIAGNOSIS — Z30.41 ENCOUNTER FOR SURVEILLANCE OF CONTRACEPTIVE PILLS: ICD-10-CM

## 2025-02-10 DIAGNOSIS — N92.1 BREAKTHROUGH BLEEDING ON BIRTH CONTROL PILLS: ICD-10-CM

## 2025-02-10 DIAGNOSIS — L70.9 ACNE, UNSPECIFIED ACNE TYPE: ICD-10-CM

## 2025-02-10 RX ORDER — NORGESTIMATE AND ETHINYL ESTRADIOL 0.25-0.035
1 KIT ORAL DAILY
Qty: 112 TABLET | Refills: 3 | OUTPATIENT
Start: 2025-02-10

## 2025-04-09 ENCOUNTER — PREP FOR PROCEDURE (OUTPATIENT)
Dept: OTOLARYNGOLOGY | Facility: CLINIC | Age: 25
End: 2025-04-09
Payer: COMMERCIAL

## 2025-04-09 DIAGNOSIS — J38.5 LARYNGEAL SPASM: Primary | ICD-10-CM

## 2025-04-09 RX ORDER — OXYMETAZOLINE HYDROCHLORIDE 0.05 G/100ML
2 SPRAY NASAL ONCE
OUTPATIENT
Start: 2025-04-09 | End: 2025-04-09

## 2025-04-15 ENCOUNTER — TELEPHONE (OUTPATIENT)
Dept: OTOLARYNGOLOGY | Facility: CLINIC | Age: 25
End: 2025-04-15
Payer: COMMERCIAL

## 2025-04-15 PROBLEM — J38.5 LARYNGEAL SPASM: Status: ACTIVE | Noted: 2025-04-09

## 2025-04-15 NOTE — TELEPHONE ENCOUNTER
Scheduled procedure with Dr. Argueta on 5/2/2025    Spoke with: Patient    Surgery is located at [facility]    Patient does not require an H&P    Anesthesia type: Local      Per case request, Patient needs scheduled for a virtual post op 6 weeks after injection as well as voice therapy set up for 2-3 weeks before & after the injection    Patient was not provided a start time for surgery & is aware they will receive this information 3-5 days before surgery    Additional comments: Patient will call back if they have any further questions or concerns    Roxana Chaney on 4/15/2025 at 3:27 PM

## 2025-04-15 NOTE — TELEPHONE ENCOUNTER
Left patient a voicemail to schedule procedure for LARYNGOSCOPY, FLEXIBLE WITH INJECTION with botox with Dr. Argueta - Left Surgery Scheduling line for callback [phone]    Roxana Chaney on 4/15/2025 at 11:06 AM

## 2025-04-16 NOTE — TELEPHONE ENCOUNTER
Scheduled patients 6 week virtual post op on 6/10/2025 at 1145am    Mi Hart  4/16/2025 at 9:09 AM

## 2025-05-02 ENCOUNTER — HOSPITAL ENCOUNTER (OUTPATIENT)
Facility: AMBULATORY SURGERY CENTER | Age: 25
Discharge: HOME | End: 2025-05-02
Attending: OTOLARYNGOLOGY | Admitting: OTOLARYNGOLOGY
Payer: COMMERCIAL

## 2025-05-02 VITALS
RESPIRATION RATE: 16 BRPM | HEART RATE: 90 BPM | SYSTOLIC BLOOD PRESSURE: 125 MMHG | OXYGEN SATURATION: 100 % | HEIGHT: 70 IN | TEMPERATURE: 97.3 F | DIASTOLIC BLOOD PRESSURE: 87 MMHG | BODY MASS INDEX: 24.34 KG/M2 | WEIGHT: 170 LBS

## 2025-05-02 PROCEDURE — 31573 LARGSC W/THER INJECTION: CPT | Mod: 50 | Performed by: OTOLARYNGOLOGY

## 2025-05-02 RX ORDER — OXYMETAZOLINE HYDROCHLORIDE 0.05 G/100ML
2 SPRAY NASAL ONCE
Status: DISCONTINUED | OUTPATIENT
Start: 2025-05-02 | End: 2025-05-02 | Stop reason: HOSPADM

## 2025-05-02 RX ORDER — LIDOCAINE HYDROCHLORIDE 40 MG/ML
SOLUTION TOPICAL PRN
Status: DISCONTINUED | OUTPATIENT
Start: 2025-05-02 | End: 2025-05-02 | Stop reason: HOSPADM

## 2025-05-02 NOTE — DISCHARGE INSTRUCTIONS
Diet restrictions: NPO until 8:45AM  Voice restrictions: none  Use tylenol as needed over the counter for pain   Other: Sore throat and discomfort are normal for a few days     [facility]  Home Care Following Your Procedure  Call a doctor if you have signs of infection (fever, growing tenderness at the surgery site, a large amount of drainage or bleeding, severe pain, foul-smelling drainage, redness, swelling).             Tylenol/Acetaminophen Consumption    If you feel your pain relief is insufficient, you may take Tylenol/Acetaminophen in addition to your narcotic pain medication.   Be careful not to exceed 4,000 mg of Tylenol/Acetaminophen in a 24 hour period from all sources.  If you are taking extra strength Tylenol/acetaminophen (500 mg), the maximum dose is 8 tablets in 24 hours.  If you are taking regular strength acetaminophen (325 mg), the maximum dose is 12 tablets in 24 hours.      Your doctor is:  Dr. Niurka Argueta, ENT Otolaryngology: [phone]                  Or dial [phone] and ask for the resident on call for:  ENT Otolaryngology  For emergency care, call the:  [place]:  [phone] (TTY for hearing impaired: [phone])

## 2025-05-02 NOTE — OP NOTE
Operative Note   Otolaryngology - Head and Neck Surgery       DATE OF OPERATION:   May 2, 2025    PREOPERATIVE DIAGNOSIS:   Adductor Spasmodic Dysphonia       POSTOPERATIVE DIAGNOSIS:   Same    NAME OF OPERATION:   Laryngoscopy with chemodenervation of the larynx  with botulinum toxin with injection into the thyroarytenoid muscle bilaterally    ANESTHESIA  Type: local    SURGEON:   Niurka Argueta MD    Medical Student  Oneida Juarez, MS3    INDICATIONS FOR PROCEDURE:   The patient is a 25 year old female with hyperfunctional larynx. Botox injections at regular intervals are routine therapy for hyperfunctional larynx such as spasmodic dysphonia, laryngospasm, chronic cough, paradoxical vocal fold motion, laryngeal synkinesis.The risks, benefits and alternatives of the surgery were discussed. The patient wishes to proceed with surgery and has signed an informed consent.     FINDINGS:     1.0 units of Botox into the Each thyroarytenoid muscle   RNC above inferior turbinate, some wasted on the left  Concentration: 10u/mL   Wasted: 98u         DESCRIPTION OF PROCEDURE:   The patient was brought into the operating room and  seated upright in the chair. Topical anesthesia was achieved by spraying 4% topical lidocaine directly onto the vocal folds through a working channel in the endoscope.  After adequate anesthesia was achieved, a flexible needle was used to inject directly the material into the thyroartyenoid muscle under visualization. Having completed this the operation was completed and the scope withdrawn atraumatically.     COMPLICATIONS:   None.  .   ESTIMATED BLOOD LOSS:   Less than 10cc    DISPOSITION:   PACU.    SPECIMENS:  * No specimens in log *       PHOTODOCUMENTATION:            PHOTODOCUMENTATION:

## 2025-05-13 ENCOUNTER — MYC MEDICAL ADVICE (OUTPATIENT)
Dept: OTOLARYNGOLOGY | Facility: CLINIC | Age: 25
End: 2025-05-13
Payer: COMMERCIAL

## 2025-05-20 ENCOUNTER — TELEPHONE (OUTPATIENT)
Dept: OTOLARYNGOLOGY | Facility: CLINIC | Age: 25
End: 2025-05-20
Payer: COMMERCIAL

## 2025-05-20 NOTE — TELEPHONE ENCOUNTER
Lvm to offer additional appt with Arcenio for return slp voice virtual visit on 5/22 @ 3pm. Slot is not held for patient, appointment is first come first served. Gave call center number to schedule if available/interested. Please do not double book if there is another patient occupying this time.  Julia Bishop on 5/20/2025 at 3:07 PM

## 2025-05-21 NOTE — TELEPHONE ENCOUNTER
No worries at all on response time!  I was writing about scheduling a single session with me with the idea of seeing if we can get different traction from the exercises now versus before the Botox.  Even with the minimal effect it may feel like it offers you some additional benefit now versus in the past.  That said it is totally reasonable if you want to defer at this time.  As far as another round of Botox you have a follow-up with Dr. Argueta on 6/10, and that would be the focus of that conversation.    Let me know if you want me to keep an eye out for additional openings on my schedule in the next few weeks, otherwise I will wait and hear from Dr. Argueta following your conversation in June.  As always do not hesitate to reach out with questions!    - Arcenio

## 2025-07-21 ENCOUNTER — PATIENT OUTREACH (OUTPATIENT)
Dept: CARE COORDINATION | Facility: CLINIC | Age: 25
End: 2025-07-21
Payer: COMMERCIAL

## 2025-08-04 ENCOUNTER — PATIENT OUTREACH (OUTPATIENT)
Dept: CARE COORDINATION | Facility: CLINIC | Age: 25
End: 2025-08-04
Payer: COMMERCIAL

## (undated) DEVICE — NDL 25GA 1.5" 305127

## (undated) DEVICE — CUP AND LID 2PK 2OZ STERILE  SSK9006A

## (undated) DEVICE — LINEN FULL SHEET 5511

## (undated) DEVICE — ENDO VALVE BX EVIS MAJ-210

## (undated) DEVICE — NDL 30GA 0.5" 305106

## (undated) DEVICE — ESU PENCIL W/HOLSTER E2350H

## (undated) DEVICE — SYR 01ML 27GA 0.5" NDL TBC 309623

## (undated) DEVICE — TUBING SUCTION 10'X3/16" N510

## (undated) DEVICE — GLOVE BIOGEL PI MICRO INDICATOR UNDERGLOVE SZ 6.5 48965

## (undated) DEVICE — BAG CLEAR TRASH 1.3M 39X33" P4040C

## (undated) DEVICE — GLOVE BIOGEL PI ULTRATOUCH SZ 6.5 41165

## (undated) DEVICE — ANTIFOG SOLUTION W/FOAM PAD 31142527

## (undated) DEVICE — NDL COUNTER 20CT 31142493

## (undated) DEVICE — KIT ENDO FIRST STEP DISINFECTANT 200ML W/POUCH EP-4

## (undated) DEVICE — PEN MARKING SKIN W/LABELS 31145884

## (undated) DEVICE — ESU GROUND PAD ADULT W/CORD E7507

## (undated) DEVICE — ENDO VALVE SUCTION BRONCH EVIS MAJ-209

## (undated) DEVICE — SUCTION MANIFOLD NEPTUNE 2 SYS 4 PORT 0702-020-000

## (undated) DEVICE — NDL 18GA 1.5" 305196

## (undated) DEVICE — SPONGE COTTONOID 1/2X3" 20-07S

## (undated) DEVICE — NDL SCLEROTHERAPY 1.8MM 26GA 200CM M00518160

## (undated) DEVICE — SYR 10ML SLIP TIP W/O NDL 303134

## (undated) DEVICE — PAD CHUX UNDERPAD 30X36" P3036C

## (undated) DEVICE — LINEN TOWEL PACK X10 5473

## (undated) DEVICE — SUCTION TIP YANKAUER W/O VENT K86

## (undated) DEVICE — SOL NACL 0.9% IRRIG 1000ML BOTTLE 2F7124

## (undated) DEVICE — SYR 10ML FINGER CONTROL W/O NDL 309695

## (undated) DEVICE — LINEN HALF SHEET 5512

## (undated) DEVICE — JELLY LUBRICATING SURGILUBE 2OZ TUBE

## (undated) DEVICE — SOL WATER IRRIG 500ML BOTTLE 2F7113

## (undated) DEVICE — SYR 03ML LL W/O NDL 309657

## (undated) DEVICE — ESU PENCIL W/HOLSTER

## (undated) DEVICE — GLOVE BIOGEL PI MICRO SZ 6.5 48565

## (undated) DEVICE — BLADE KNIFE SURG 11 371111

## (undated) DEVICE — LINEN TOWEL PACK X5 5464

## (undated) DEVICE — DRSG GAUZE 4X4" 3033

## (undated) DEVICE — SU VICRYL 4-0 PS-2 18" UND J496H

## (undated) DEVICE — PACK MINOR LITHOTOMY RIDGES

## (undated) RX ORDER — OXYMETAZOLINE HYDROCHLORIDE 0.05 G/100ML
SPRAY NASAL
Status: DISPENSED
Start: 2025-05-02

## (undated) RX ORDER — ACETAMINOPHEN 325 MG/1
TABLET ORAL
Status: DISPENSED
Start: 2023-11-01

## (undated) RX ORDER — BUPIVACAINE HYDROCHLORIDE 2.5 MG/ML
INJECTION, SOLUTION EPIDURAL; INFILTRATION; INTRACAUDAL
Status: DISPENSED
Start: 2023-11-01

## (undated) RX ORDER — LIDOCAINE HYDROCHLORIDE 40 MG/ML
SOLUTION TOPICAL
Status: DISPENSED
Start: 2025-05-02

## (undated) RX ORDER — KETOROLAC TROMETHAMINE 30 MG/ML
INJECTION, SOLUTION INTRAMUSCULAR; INTRAVENOUS
Status: DISPENSED
Start: 2023-11-01

## (undated) RX ORDER — LIDOCAINE HYDROCHLORIDE AND EPINEPHRINE 10; 10 MG/ML; UG/ML
INJECTION, SOLUTION INFILTRATION; PERINEURAL
Status: DISPENSED
Start: 2025-05-02

## (undated) RX ORDER — SILVER SULFADIAZINE 10 MG/G
CREAM TOPICAL
Status: DISPENSED
Start: 2023-11-01

## (undated) RX ORDER — CEFAZOLIN SODIUM/WATER 2 G/20 ML
SYRINGE (ML) INTRAVENOUS
Status: DISPENSED
Start: 2023-11-01

## (undated) RX ORDER — FENTANYL CITRATE 50 UG/ML
INJECTION, SOLUTION INTRAMUSCULAR; INTRAVENOUS
Status: DISPENSED
Start: 2023-11-01